# Patient Record
Sex: FEMALE | Race: WHITE | NOT HISPANIC OR LATINO | Employment: OTHER | URBAN - METROPOLITAN AREA
[De-identification: names, ages, dates, MRNs, and addresses within clinical notes are randomized per-mention and may not be internally consistent; named-entity substitution may affect disease eponyms.]

---

## 2019-08-26 ENCOUNTER — HOSPITAL ENCOUNTER (INPATIENT)
Facility: HOSPITAL | Age: 84
LOS: 4 days | Discharge: HOSPICE | DRG: 064 | End: 2019-08-30
Attending: EMERGENCY MEDICINE | Admitting: FAMILY MEDICINE
Payer: MEDICARE

## 2019-08-26 ENCOUNTER — APPOINTMENT (EMERGENCY)
Dept: CT IMAGING | Facility: HOSPITAL | Age: 84
DRG: 064 | End: 2019-08-26
Payer: MEDICARE

## 2019-08-26 DIAGNOSIS — N39.0 UTI (URINARY TRACT INFECTION): ICD-10-CM

## 2019-08-26 DIAGNOSIS — R53.1 RIGHT SIDED WEAKNESS: Primary | ICD-10-CM

## 2019-08-26 DIAGNOSIS — I10 HTN (HYPERTENSION): ICD-10-CM

## 2019-08-26 DIAGNOSIS — E11.9 NEWLY DIAGNOSED DIABETES (HCC): ICD-10-CM

## 2019-08-26 DIAGNOSIS — I65.22 STENOSIS OF LEFT INTERNAL CAROTID ARTERY: ICD-10-CM

## 2019-08-26 DIAGNOSIS — G93.40 ACUTE ENCEPHALOPATHY: ICD-10-CM

## 2019-08-26 DIAGNOSIS — I48.91 NEW ONSET ATRIAL FIBRILLATION (HCC): ICD-10-CM

## 2019-08-26 DIAGNOSIS — R73.9 HYPERGLYCEMIA: ICD-10-CM

## 2019-08-26 DIAGNOSIS — E78.5 HLD (HYPERLIPIDEMIA): ICD-10-CM

## 2019-08-26 DIAGNOSIS — Z51.5 HOSPICE CARE PATIENT: ICD-10-CM

## 2019-08-26 DIAGNOSIS — R47.01 APHASIA: ICD-10-CM

## 2019-08-26 DIAGNOSIS — I63.9 CEREBROVASCULAR ACCIDENT (CVA), UNSPECIFIED MECHANISM (HCC): ICD-10-CM

## 2019-08-26 DIAGNOSIS — R41.82 ALTERED MENTAL STATUS: ICD-10-CM

## 2019-08-26 PROBLEM — R26.2 AMBULATORY DYSFUNCTION: Status: ACTIVE | Noted: 2019-08-26

## 2019-08-26 PROBLEM — E78.2 MIXED HYPERLIPIDEMIA: Status: ACTIVE | Noted: 2019-08-26

## 2019-08-26 PROBLEM — R82.71 ASYMPTOMATIC BACTERIURIA: Status: ACTIVE | Noted: 2019-08-26

## 2019-08-26 LAB
ALBUMIN SERPL BCP-MCNC: 3.3 G/DL (ref 3.5–5)
ALP SERPL-CCNC: 65 U/L (ref 46–116)
ALT SERPL W P-5'-P-CCNC: 14 U/L (ref 12–78)
ANION GAP SERPL CALCULATED.3IONS-SCNC: 9 MMOL/L (ref 4–13)
APTT PPP: 21 SECONDS (ref 23–37)
AST SERPL W P-5'-P-CCNC: 17 U/L (ref 5–45)
BACTERIA UR QL AUTO: ABNORMAL /HPF
BASOPHILS # BLD AUTO: 0.07 THOUSANDS/ΜL (ref 0–0.1)
BASOPHILS NFR BLD AUTO: 1 % (ref 0–1)
BILIRUB DIRECT SERPL-MCNC: 0.2 MG/DL (ref 0–0.2)
BILIRUB SERPL-MCNC: 0.6 MG/DL (ref 0.2–1)
BILIRUB UR QL STRIP: NEGATIVE
BUN SERPL-MCNC: 17 MG/DL (ref 5–25)
CALCIUM SERPL-MCNC: 9.3 MG/DL (ref 8.3–10.1)
CHLORIDE SERPL-SCNC: 100 MMOL/L (ref 100–108)
CHOLEST SERPL-MCNC: 206 MG/DL (ref 50–200)
CLARITY UR: ABNORMAL
CO2 SERPL-SCNC: 29 MMOL/L (ref 21–32)
COLOR UR: YELLOW
CREAT SERPL-MCNC: 1.19 MG/DL (ref 0.6–1.3)
EOSINOPHIL # BLD AUTO: 0.12 THOUSAND/ΜL (ref 0–0.61)
EOSINOPHIL NFR BLD AUTO: 1 % (ref 0–6)
ERYTHROCYTE [DISTWIDTH] IN BLOOD BY AUTOMATED COUNT: 13 % (ref 11.6–15.1)
EST. AVERAGE GLUCOSE BLD GHB EST-MCNC: 157 MG/DL
GFR SERPL CREATININE-BSD FRML MDRD: 40 ML/MIN/1.73SQ M
GLUCOSE SERPL-MCNC: 142 MG/DL (ref 65–140)
GLUCOSE SERPL-MCNC: 165 MG/DL (ref 65–140)
GLUCOSE SERPL-MCNC: 253 MG/DL (ref 65–140)
GLUCOSE UR STRIP-MCNC: NEGATIVE MG/DL
HBA1C MFR BLD: 7.1 % (ref 4.2–6.3)
HCT VFR BLD AUTO: 39.1 % (ref 34.8–46.1)
HDLC SERPL-MCNC: 42 MG/DL (ref 40–60)
HGB BLD-MCNC: 12.9 G/DL (ref 11.5–15.4)
HGB UR QL STRIP.AUTO: ABNORMAL
IMM GRANULOCYTES # BLD AUTO: 0.06 THOUSAND/UL (ref 0–0.2)
IMM GRANULOCYTES NFR BLD AUTO: 1 % (ref 0–2)
INR PPP: 1.02 (ref 0.84–1.19)
KETONES UR STRIP-MCNC: ABNORMAL MG/DL
LDLC SERPL CALC-MCNC: 129 MG/DL (ref 0–100)
LEUKOCYTE ESTERASE UR QL STRIP: ABNORMAL
LYMPHOCYTES # BLD AUTO: 1.95 THOUSANDS/ΜL (ref 0.6–4.47)
LYMPHOCYTES NFR BLD AUTO: 15 % (ref 14–44)
MCH RBC QN AUTO: 30.6 PG (ref 26.8–34.3)
MCHC RBC AUTO-ENTMCNC: 33 G/DL (ref 31.4–37.4)
MCV RBC AUTO: 93 FL (ref 82–98)
MONOCYTES # BLD AUTO: 1.27 THOUSAND/ΜL (ref 0.17–1.22)
MONOCYTES NFR BLD AUTO: 10 % (ref 4–12)
NEUTROPHILS # BLD AUTO: 9.4 THOUSANDS/ΜL (ref 1.85–7.62)
NEUTS SEG NFR BLD AUTO: 72 % (ref 43–75)
NITRITE UR QL STRIP: NEGATIVE
NON-SQ EPI CELLS URNS QL MICRO: ABNORMAL /HPF
NRBC BLD AUTO-RTO: 0 /100 WBCS
PH UR STRIP.AUTO: 6 [PH]
PLATELET # BLD AUTO: 143 THOUSANDS/UL (ref 149–390)
PLATELET # BLD AUTO: 190 THOUSANDS/UL (ref 149–390)
PMV BLD AUTO: 11.2 FL (ref 8.9–12.7)
PMV BLD AUTO: 12.1 FL (ref 8.9–12.7)
POTASSIUM SERPL-SCNC: 3.5 MMOL/L (ref 3.5–5.3)
PROT SERPL-MCNC: 7.6 G/DL (ref 6.4–8.2)
PROT UR STRIP-MCNC: ABNORMAL MG/DL
PROTHROMBIN TIME: 13.4 SECONDS (ref 11.6–14.5)
RBC # BLD AUTO: 4.21 MILLION/UL (ref 3.81–5.12)
RBC #/AREA URNS AUTO: ABNORMAL /HPF
SODIUM SERPL-SCNC: 138 MMOL/L (ref 136–145)
SP GR UR STRIP.AUTO: >=1.03 (ref 1–1.03)
TRIGL SERPL-MCNC: 176 MG/DL
TROPONIN I SERPL-MCNC: <0.02 NG/ML
UROBILINOGEN UR QL STRIP.AUTO: 1 E.U./DL
WBC # BLD AUTO: 12.87 THOUSAND/UL (ref 4.31–10.16)
WBC #/AREA URNS AUTO: ABNORMAL /HPF

## 2019-08-26 PROCEDURE — 85049 AUTOMATED PLATELET COUNT: CPT | Performed by: FAMILY MEDICINE

## 2019-08-26 PROCEDURE — 99223 1ST HOSP IP/OBS HIGH 75: CPT | Performed by: PSYCHIATRY & NEUROLOGY

## 2019-08-26 PROCEDURE — 87086 URINE CULTURE/COLONY COUNT: CPT | Performed by: EMERGENCY MEDICINE

## 2019-08-26 PROCEDURE — 96375 TX/PRO/DX INJ NEW DRUG ADDON: CPT

## 2019-08-26 PROCEDURE — 93005 ELECTROCARDIOGRAM TRACING: CPT

## 2019-08-26 PROCEDURE — 80061 LIPID PANEL: CPT | Performed by: EMERGENCY MEDICINE

## 2019-08-26 PROCEDURE — 85610 PROTHROMBIN TIME: CPT | Performed by: EMERGENCY MEDICINE

## 2019-08-26 PROCEDURE — 83036 HEMOGLOBIN GLYCOSYLATED A1C: CPT | Performed by: EMERGENCY MEDICINE

## 2019-08-26 PROCEDURE — 96365 THER/PROPH/DIAG IV INF INIT: CPT

## 2019-08-26 PROCEDURE — 81001 URINALYSIS AUTO W/SCOPE: CPT | Performed by: EMERGENCY MEDICINE

## 2019-08-26 PROCEDURE — 80076 HEPATIC FUNCTION PANEL: CPT | Performed by: EMERGENCY MEDICINE

## 2019-08-26 PROCEDURE — 99285 EMERGENCY DEPT VISIT HI MDM: CPT | Performed by: EMERGENCY MEDICINE

## 2019-08-26 PROCEDURE — 36415 COLL VENOUS BLD VENIPUNCTURE: CPT | Performed by: EMERGENCY MEDICINE

## 2019-08-26 PROCEDURE — 84484 ASSAY OF TROPONIN QUANT: CPT | Performed by: EMERGENCY MEDICINE

## 2019-08-26 PROCEDURE — 85025 COMPLETE CBC W/AUTO DIFF WBC: CPT | Performed by: EMERGENCY MEDICINE

## 2019-08-26 PROCEDURE — 70496 CT ANGIOGRAPHY HEAD: CPT

## 2019-08-26 PROCEDURE — 82948 REAGENT STRIP/BLOOD GLUCOSE: CPT

## 2019-08-26 PROCEDURE — 85730 THROMBOPLASTIN TIME PARTIAL: CPT | Performed by: EMERGENCY MEDICINE

## 2019-08-26 PROCEDURE — 70498 CT ANGIOGRAPHY NECK: CPT

## 2019-08-26 PROCEDURE — 99223 1ST HOSP IP/OBS HIGH 75: CPT | Performed by: FAMILY MEDICINE

## 2019-08-26 PROCEDURE — 70450 CT HEAD/BRAIN W/O DYE: CPT

## 2019-08-26 PROCEDURE — 96361 HYDRATE IV INFUSION ADD-ON: CPT

## 2019-08-26 PROCEDURE — 80048 BASIC METABOLIC PNL TOTAL CA: CPT | Performed by: EMERGENCY MEDICINE

## 2019-08-26 PROCEDURE — 99285 EMERGENCY DEPT VISIT HI MDM: CPT

## 2019-08-26 RX ORDER — ONDANSETRON 2 MG/ML
4 INJECTION INTRAMUSCULAR; INTRAVENOUS EVERY 4 HOURS PRN
Status: DISCONTINUED | OUTPATIENT
Start: 2019-08-26 | End: 2019-08-30 | Stop reason: HOSPADM

## 2019-08-26 RX ORDER — HYDRALAZINE HYDROCHLORIDE 20 MG/ML
5 INJECTION INTRAMUSCULAR; INTRAVENOUS ONCE
Status: COMPLETED | OUTPATIENT
Start: 2019-08-26 | End: 2019-08-26

## 2019-08-26 RX ORDER — SODIUM CHLORIDE 9 MG/ML
75 INJECTION, SOLUTION INTRAVENOUS CONTINUOUS
Status: DISCONTINUED | OUTPATIENT
Start: 2019-08-26 | End: 2019-08-27

## 2019-08-26 RX ORDER — HYDRALAZINE HYDROCHLORIDE 20 MG/ML
5 INJECTION INTRAMUSCULAR; INTRAVENOUS EVERY 6 HOURS PRN
Status: DISCONTINUED | OUTPATIENT
Start: 2019-08-26 | End: 2019-08-26

## 2019-08-26 RX ORDER — HYDRALAZINE HYDROCHLORIDE 20 MG/ML
10 INJECTION INTRAMUSCULAR; INTRAVENOUS EVERY 6 HOURS PRN
Status: DISCONTINUED | OUTPATIENT
Start: 2019-08-26 | End: 2019-08-29 | Stop reason: ALTCHOICE

## 2019-08-26 RX ORDER — HYDRALAZINE HYDROCHLORIDE 20 MG/ML
5 INJECTION INTRAMUSCULAR; INTRAVENOUS ONCE
Status: DISCONTINUED | OUTPATIENT
Start: 2019-08-26 | End: 2019-08-30 | Stop reason: HOSPADM

## 2019-08-26 RX ORDER — HEPARIN SODIUM 5000 [USP'U]/ML
5000 INJECTION, SOLUTION INTRAVENOUS; SUBCUTANEOUS EVERY 12 HOURS SCHEDULED
Status: DISCONTINUED | OUTPATIENT
Start: 2019-08-26 | End: 2019-08-29 | Stop reason: ALTCHOICE

## 2019-08-26 RX ADMIN — CEFTRIAXONE SODIUM 1000 MG: 10 INJECTION, POWDER, FOR SOLUTION INTRAVENOUS at 13:20

## 2019-08-26 RX ADMIN — IODIXANOL 85 ML: 320 INJECTION, SOLUTION INTRAVASCULAR at 12:52

## 2019-08-26 RX ADMIN — SODIUM CHLORIDE 1000 ML: 0.9 INJECTION, SOLUTION INTRAVENOUS at 13:16

## 2019-08-26 RX ADMIN — HYDRALAZINE HYDROCHLORIDE 5 MG: 20 INJECTION INTRAMUSCULAR; INTRAVENOUS at 15:24

## 2019-08-26 RX ADMIN — HEPARIN SODIUM 5000 UNITS: 5000 INJECTION INTRAVENOUS; SUBCUTANEOUS at 21:50

## 2019-08-26 RX ADMIN — HYDRALAZINE HYDROCHLORIDE 5 MG: 20 INJECTION INTRAMUSCULAR; INTRAVENOUS at 14:20

## 2019-08-26 RX ADMIN — INSULIN LISPRO 1 UNITS: 100 INJECTION, SOLUTION INTRAVENOUS; SUBCUTANEOUS at 19:10

## 2019-08-26 RX ADMIN — SODIUM CHLORIDE 75 ML/HR: 0.9 INJECTION, SOLUTION INTRAVENOUS at 16:30

## 2019-08-26 NOTE — ASSESSMENT & PLAN NOTE
Patient presented with urinalysis showing moderate leukocytes, occasional bacteria, innumerable WBCs, negative nitrites  Patient has been asymptomatic however in the setting of acute encephalopathy and recent fall will treat patient for presumed UTI  Continue Rocephin 1 g IV Q 24 hours  Follow up urine cultures

## 2019-08-26 NOTE — ASSESSMENT & PLAN NOTE
Patient presents with the acute onset of encephalopathy as noticed by her son and daughter-in-law for the past 2 days after a fall  Differential diagnoses include:  Stroke vs UTI vs hypertensive encephalopathy vs seizure vs metabolic abnormalities e g  Hyperglycemia  Most likely contributors to patient's acute encephalopathy is her advanced age and possible advanced dementia with superimposed evolving stroke and UTI  Other contributing factors are the hyperglycemia and accelerated hypertension  Patient will be admitted to the step-down unit and placed on the stroke pathway  CT head without contrast as well as CTA of head and neck with and without contrast negative for any acute findings  As per Neurology, will order MRI brain  Treat UTI with antibiotics  [place] sliding scale insulin protocol to deal with hyperglycemia and check hemoglobin A1c  Adequately treat accelerated hypertension with prn hydralazine  Appreciate Neurology consult and recommendations  Continue frequent neuro checks

## 2019-08-26 NOTE — H&P
H&P- Lilly Yusuf 11/19/1926, 80 y o  female MRN: [id number]    Unit/Bed#: ED 13 Encounter: [id number]    Primary Care Provider: No primary care provider on file  Date and time admitted to hospital: 8/26/2019 10:35 AM        * Acute encephalopathy  Assessment & Plan  Patient presents with the acute onset of encephalopathy as noticed by her son and daughter-in-law for the past 2 days after a fall  Differential diagnoses include:  Stroke vs UTI vs hypertensive encephalopathy vs seizure vs metabolic abnormalities e g  Hyperglycemia  Most likely contributors to patient's acute encephalopathy is her advanced age and possible advanced dementia with superimposed evolving stroke and UTI  Other contributing factors are the hyperglycemia and accelerated hypertension  Patient will be admitted to the step-down unit and placed on the stroke pathway  CT head without contrast as well as CTA of head and neck with and without contrast negative for any acute findings  As per Neurology, will order MRI brain  Treat UTI with antibiotics  [place] sliding scale insulin protocol to deal with hyperglycemia and check hemoglobin A1c  Adequately treat accelerated hypertension with prn hydralazine  Appreciate Neurology consult and recommendations  Continue frequent neuro checks  Aphasia  Assessment & Plan  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  CTA head and neck with and without contrast showed similar findings on CT of head without contrast along with greater than 70% luminal constriction of left ICA at the origin approximately 40% stenosis at the origin of the right ICA  Patient seen and evaluated by Neurology    Suspicion for stroke is high given acuity of symptoms and complete right sided weakness in both upper lower extremities in the context of new onset Afib and left ICA findings  Patient has been placed on stroke pathway  Continue frequent neuro checks  Continue telemetry  Will keep patient NPO, perform swallow study and if patient fails swallow study coma will institute rectal aspirin but if she passes the swallow study will start oral aspirin and statin  Check MRI brain, 2D echo  Check hemoglobin A1c  As per Neurology recommendations, vascular surgery consult has been placed for recommendations regarding left ICA stenosis of 70%  Continue permissive hypertension to maintain SBP at 140-160  PT/OT, PM&R consult  Ambulatory dysfunction  Assessment & Plan  Patient presented with ambulatory dysfunction secondary to ataxia from possible stroke  Patient had a witnessed fall 2 days ago in her bathroom by her daughter-in-law  Will institute fall precautions  PT/OT  New onset atrial fibrillation [facility])  Assessment & Plan  Found to be in new onset AFib during evaluation in the ED  Patient is currently in rate controlled AFib  Continue telemetry monitoring  Cardiology consult placed  Consideration to be given for anticoagulation given possible stroke  CHADS2-VASc2 score-6  Accelerated hypertension  Assessment & Plan  Patient presented with accelerated hypertension with SBP in the 220s  Attempts have been made to control patient's systolic BP with p r n  Doses of IV hydralazine  However if these are not successful, will consider placing patient on step-down and initiating Cardene drip until we can get SBP down to 140-160  Will need to allow permissive hypertension in the setting of presumed stroke to prevent cerebral ischemia  Hyperglycemia  Assessment & Plan  Patient has no known history of type 2 diabetes  Presents with hyperglycemia with blood sugar at 253  Will initiate sliding scale insulin coverage, and will advance to diabetic diet once patient passes a swallow study  Will check a hemoglobin A1c  Accu-Cheks Q a c   And HS    Asymptomatic bacteriuria  Assessment & Plan  Patient presented with urinalysis showing moderate leukocytes, occasional bacteria, innumerable WBCs, negative nitrites  Patient has been asymptomatic however in the setting of acute encephalopathy and recent fall will treat patient for presumed UTI  Continue Rocephin 1 g IV Q 24 hours  Follow up urine cultures  Mixed hyperlipidemia  Assessment & Plan  Patient has elevated total cholesterol including elevated LDL and elevated triglycerides  Will initiate statin once patient passes swallow study  Stenosis of left internal carotid artery  Assessment & Plan  Vascular surgery consult placed at the request of Neurology  VTE Prophylaxis: Heparin  / sequential compression device   Code Status:  DNR/DNI  POLST: POLST form is on file already (pre-hospital)  Discussion with family:  Yes with her son and her daughter-in-law at the bedside    Anticipated Length of Stay:  Patient will be admitted on an Inpatient basis with an anticipated length of stay of  greater than 2 midnights  Justification for Hospital Stay:  Acute encephalopathy with aphasia, ambulatory dysfunction, new onset atrial fibrillation, accelerated hypertension, UTI, and hyperglycemia  Total Time for Visit, including Counseling / Coordination of Care: 45 minutes  Greater than 50% of this total time spent on direct patient counseling and coordination of care  Chief Complaint:   Fall 2 days ago with associated altered mental status and aphasia as well as ambulatory dysfunction  History of Present Illness:    Sung Benitez is a 80 y o  female patient with no known past medical history who presented from home with her son and her daughter-in-law due to a complaint of a fall she sustained 2 days ago on her right side with now near-total neglect of her entire right side of her body with associated aphasia, and altered mental status    According to the patient's son, he states that his mother was in her usual state of health until 2 days ago on Saturday when she fell on her right side on the edge of the bathtub  Afterwards, he states that she was okay except for some pain on the right side  However she spent the whole day Sunday in bed and was able to eat all of her meals and have normal conversations  He then states that this morning she was more confused than normal and was not talking or answering the questions  She also had entire weakness of her right upper and right lower extremities  Upon presentation to the ED, she was found to be in new onset atrial fibrillation with accelerated hypertension and SBPs >220s  She was also found to have an elevated blood sugar of 253 under possible UTI  A CT of the head without contrast was negative except for old chronic infarcts and a CTA of the head and neck with and without contrast was also negative for any acute findings except for a 70% stenosis in the left ICA  She was given a dose of IV Rocephin and a bolus of 1 L of normal saline  She was also treated with IV hydralazine 5 mg x2 in an attempt to bring down her blood pressure  She has been seen and evaluated by Neurology and they recommend admitting her under stroke pathway for further workup  Review of Systems:    Review of Systems   Unable to perform ROS: Mental status change       Past Medical and Surgical History:     Past Medical History:   Diagnosis Date    Dementia        History reviewed  No pertinent surgical history  Meds/Allergies:    Prior to Admission medications    Not on File     I have reviewed home medications using [employer]      Allergies: No Known Allergies    Social History:     Marital Status:    Occupation:  None  Patient Pre-hospital Living Situation:  Lives at home  Patient Pre-hospital Level of Mobility:  Unknown  Patient Pre-hospital Diet Restrictions:  None  Substance Use History:   Social History     Substance and Sexual Activity   Alcohol Use Never    Frequency: Never Social History     Tobacco Use   Smoking Status Never Smoker   Smokeless Tobacco Never Used     Social History     Substance and Sexual Activity   Drug Use Never       Family History:    History reviewed  No pertinent family history  Physical Exam:     Vitals:   Blood Pressure: (!) 228/109 (08/26/19 1515)  Pulse: 79 (08/26/19 1530)  Temperature: 97 9 °F (36 6 °C) (08/26/19 1035)  Temp Source: Oral (08/26/19 1035)  Respirations: 20 (08/26/19 1530)  Height: 5' (152 4 cm) (08/26/19 1035)  Weight - Scale: 54 2 kg (119 lb 7 8 oz) (08/26/19 1035)  SpO2: 97 % (08/26/19 1530)    Physical Exam   Constitutional: She appears well-developed and well-nourished  No distress  HENT:   Head: Normocephalic and atraumatic  Poor oral dentition  Eyes: Pupils are equal, round, and reactive to light  EOM are normal  No scleral icterus  Neck: Normal range of motion  Neck supple  No JVD present  Cardiovascular: Normal rate and normal heart sounds  No murmur heard  Irregularly irregular rhythm   Pulmonary/Chest: Effort normal and breath sounds normal  She has no wheezes  She has no rales  Abdominal: Soft  Bowel sounds are normal  She exhibits no distension  There is no tenderness  There is no rebound and no guarding  Musculoskeletal: She exhibits no edema or tenderness  Lymphadenopathy:     She has no cervical adenopathy  Neurological: She is alert  Skin: Skin is warm and dry  She is not diaphoretic  Additional Data:     Lab Results: I have personally reviewed pertinent reports        Results from last 7 days   Lab Units 08/26/19  1204   WBC Thousand/uL 12 87*   HEMOGLOBIN g/dL 12 9   HEMATOCRIT % 39 1   PLATELETS Thousands/uL 143*   NEUTROS PCT % 72   LYMPHS PCT % 15   MONOS PCT % 10   EOS PCT % 1     Results from last 7 days   Lab Units 08/26/19  1204   SODIUM mmol/L 138   POTASSIUM mmol/L 3 5   CHLORIDE mmol/L 100   CO2 mmol/L 29   BUN mg/dL 17   CREATININE mg/dL 1 19   ANION GAP mmol/L 9   CALCIUM mg/dL 9 3   ALBUMIN g/dL 3 3*   TOTAL BILIRUBIN mg/dL 0 60   ALK PHOS U/L 65   ALT U/L 14   AST U/L 17   GLUCOSE RANDOM mg/dL 253*     Results from last 7 days   Lab Units 08/26/19  1204   INR  1 02                   Imaging: I have personally reviewed pertinent reports  CTA head and neck with and without contrast   Final Result by Selene Rodriguez MD (08/26 2875)   Small chronic cortical infarction left frontal vertex, consistent with CT      Greater than 70% luminal constriction at the origin of the left internal carotid artery      Approximately 40% stenosis at the origin of the right internal carotid artery      No additional evidence of critical stenosis, dissection or occlusion involving remaining cervical carotid or vertebral segments or visualized cerebral arteries      Advanced multilevel degenerative cervical spondylosis                     Workstation performed: [id number]         CT recon only cervical spine (No Charge)   Final Result by Selene Rodriguez MD (08/26 2287)   Advanced multilevel degenerative spondylosis      No acute cervical spine fracture or traumatic malalignment  Workstation performed: [id number]         CT head without contrast   Final Result by Hope Chavez MD (08/26 8746)      No acute intracranial process  No skull fracture  Chronic microangiopathy and 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  Workstation performed: [id number]         MRI Inpatient Order    (Results Pending)       EKG, Pathology, and Other Studies Reviewed on Admission:   · EKG:  CT head without contrast, CTA head and neck with and without contrast     Allscripts / Epic Records Reviewed: Yes     ** Please Note: This note has been constructed using a voice recognition system   **

## 2019-08-26 NOTE — ED NOTES
Notified Kenisha Santana  And Dr Summer Molina patient fall no loss of consciousness and no blood thinners  Placed order for CT scan of head       Isaiah Bliss RN  08/26/19 3268

## 2019-08-26 NOTE — ASSESSMENT & PLAN NOTE
Patient presented with ambulatory dysfunction secondary to ataxia from possible stroke  Patient had a witnessed fall 2 days ago in her bathroom by her daughter-in-law  Will institute fall precautions  PT/OT

## 2019-08-26 NOTE — ASSESSMENT & PLAN NOTE
Patient has no known history of type 2 diabetes  Presents with hyperglycemia with blood sugar at 253  Will initiate sliding scale insulin coverage, and will advance to diabetic diet once patient passes a swallow study  Will check a hemoglobin A1c  Accu-Cheks Q a c   And HS

## 2019-08-26 NOTE — ED NOTES
1 CC                              Fall, altered mental status and right sided weakness  2  Orientation status            Baseline dementia with confusion  3  Abnormal labs, tests, vitals      Triglycerides 176, cholesterol 206, , glucose 253, WBC 12 87        T 97 9, /102, HR 62 R 24, 97% room air  4  Medication drips                none  5  Time of last narcotics        none  6  IV lines, drains, tubes         # 20 right ac  7  Isolation status                  none  8  Skin                                  wdl  9  Ambulation status           Abnormal gait dysfunction  10   ED phone number           Matt Lipscomb RN  08/26/19 8369

## 2019-08-26 NOTE — NURSING NOTE
Patient arrived from Emergency Department unable to complete NIH stroke appropriately due to patient being nonverbal and being unable to follow commands    Called emergency contact for patient that is listed and left message for call back in order to fill out admission data base and MRI check list

## 2019-08-26 NOTE — ASSESSMENT & PLAN NOTE
Patient presented with accelerated hypertension with SBP in the 220s  Attempts have been made to control patient's systolic BP with p r n  Doses of IV hydralazine  However if these are not successful, will consider placing patient on step-down and initiating Cardene drip until we can get SBP down to 140-160  Will need to allow permissive hypertension in the setting of presumed stroke to prevent cerebral ischemia

## 2019-08-26 NOTE — ASSESSMENT & PLAN NOTE
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  CTA head and neck with and without contrast showed similar findings on CT of head without contrast along with greater than 70% luminal constriction of left ICA at the origin approximately 40% stenosis at the origin of the right ICA  Patient seen and evaluated by Neurology  Suspicion for stroke is high given acuity of symptoms and complete right sided weakness in both upper lower extremities in the context of new onset Afib and left ICA findings  Patient has been placed on stroke pathway  Continue frequent neuro checks  Continue telemetry  Will keep patient NPO, perform swallow study and if patient fails swallow study coma will institute rectal aspirin but if she passes the swallow study will start oral aspirin and statin  Check MRI brain, 2D echo  Check hemoglobin A1c  As per Neurology recommendations, vascular surgery consult has been placed for recommendations regarding left ICA stenosis of 70%  Continue permissive hypertension to maintain SBP at 140-160  PT/OT, PM&R consult

## 2019-08-26 NOTE — ASSESSMENT & PLAN NOTE
Patient has elevated total cholesterol including elevated LDL and elevated triglycerides  Will initiate statin once patient passes swallow study

## 2019-08-26 NOTE — ASSESSMENT & PLAN NOTE
Found to be in new onset AFib during evaluation in the ED  Patient is currently in rate controlled AFib  Continue telemetry monitoring  Cardiology consult placed  Consideration to be given for anticoagulation given possible stroke  CHADS2-VASc2 score-6

## 2019-08-26 NOTE — CONSULTS
Consultation - Neurology   [facility] 80 y o  female MRN: [id number]  Unit/Bed#: ED 15 Encounter: [id number]      Assessment/Plan   Assessment/Plan:  59-year-old female with history of dementia presenting with instability, truncal ataxia with leaning to the right and aphasia  Exam is consistent with left hemisphere infarction, potentially due to new onset atrial fibrillation versus symptomatic left ICA stenosis  1  Ambulatory dysfunction/gait dysfunction, aphasia:  -initiate stroke pathway:  -initial CT head with no acute intracranial pathology  -CTA head/neck with L ICA stenosis   -recommend vascular surgery input in regards to L ICA stenosis  -obtain MRI brain  -obtain echocardiogram  -rectal aspirin if she fails dysphagia evaluation, if swallowing ok oral aspirin and statin  -check hemoglobin A1C and lipid panel  -frequent neuro checks  -telemetry monitoring  -stroke education to be provided  -therapies to follow  -will attempt to contact family further discuss baseline neurologic status/history of dementia    Discussed plan of care with attending neurologist     History of Present Illness     Reason for Consult / Principal Problem:  Ambulatory/gait dysfunction, truncal ataxia, aphasia  Hx and PE limited by:  Patient with dementia, language barrier as well  HPI: [facility] is a 80 y o   female with history as mentioned above in assessment who neurology is asked to evaluate in regards to ambulatory dysfunction with leaning to the right as well as aphasia  History obtained primarily through chart review as patient carries history of dementia per ED team   Patient reportedly sustained a fall yesterday, she went to bed relatively early last night and upon waking up this morning noticed that she continued to lean to the right with ambulation and gait attempts  There was also concern for aphasia and thus patient was brought to the ED for evaluation    Patient has been markedly hypertensive throughout the course of the morning, CT head was obtained negative for acute intracranial pathology, did note chronic left frontal cortical infarct  EKG per ED team revealing atrial fibrillation  No prior notes nor accessibility to Care everywhere at time of exam      Consult to neurology  Consult performed by: Elvin Hazel PA-C  Consult ordered by: Margaret Christian MD          Review of Systems   Reason unable to perform ROS: Dementia, language barrier  Historical Information   Past Medical History:   Diagnosis Date    Dementia      History reviewed  No pertinent surgical history  Social History   Social History     Substance and Sexual Activity   Alcohol Use Never    Frequency: Never     Social History     Substance and Sexual Activity   Drug Use Never     Social History     Tobacco Use   Smoking Status Never Smoker   Smokeless Tobacco Never Used     Family History: History reviewed  No pertinent family history  Review of previous medical records was completed  Meds/Allergies   current meds:   Current Facility-Administered Medications   Medication Dose Route Frequency    ceftriaxone (ROCEPHIN) 1 g/50 mL in dextrose IVPB  1,000 mg Intravenous Once    sodium chloride 0 9 % bolus 1,000 mL  1,000 mL Intravenous Once    and PTA meds:   None       No Known Allergies    Objective   Vitals:Blood pressure (!) 172/85, pulse 61, temperature 97 9 °F (36 6 °C), temperature source Oral, resp  rate (!) 45, height 5' (1 524 m), weight 54 2 kg (119 lb 7 8 oz), SpO2 95 %  ,Body mass index is 23 34 kg/m²  No intake or output data in the 24 hours ending 08/26/19 1313    Invasive Devices: Invasive Devices     Peripheral Intravenous Line            Peripheral IV 08/26/19 Right Antecubital less than 1 day                Physical Exam   Constitutional: She appears well-developed and well-nourished  HENT:   Head: Normocephalic and atraumatic  Eyes: Pupils are equal, round, and reactive to light   EOM are normal    Neck: Normal range of motion  Neck supple  Cardiovascular: Bradycardia present  Pulmonary/Chest: Effort normal and breath sounds normal    Abdominal: Soft  Bowel sounds are normal    Musculoskeletal: Normal range of motion  Neurological: She is alert  Reflex Scores:       Bicep reflexes are 1+ on the right side and 1+ on the left side  Brachioradialis reflexes are 1+ on the right side and 1+ on the left side  Skin: Skin is warm and dry  Neurologic Exam     Mental Status   Awake and alert, no verbal output (speaks foreign language per ED team)  Cranial Nerves     CN II   Visual fields full to confrontation  CN III, IV, VI   Pupils are equal, round, and reactive to light  Extraocular motions are normal    Nystagmus: none   Diplopia: none  Ophthalmoparesis: none    CN V   Facial sensation intact  CN VII   Facial expression full, symmetric  CN VIII   CN VIII normal      CN IX, X   CN IX normal    CN X normal      CN XI   CN XI normal      CN XII   CN XII normal      Motor Exam With passive lift and release, less tone on R than L, L arm can maintain antigravity  Spontaneously and purposely moving L arm  Less tone in R leg, brisker withdrawal of L leg to plantar and noxious stimuli than on R  Sensory Exam   Cannot formally assess with language barrier, grimace to noxious stimuli in all extremities        Gait, Coordination, and Reflexes     Reflexes   Right brachioradialis: 1+  Left brachioradialis: 1+  Right biceps: 1+  Left biceps: 1+  Right ankle clonus: absent  Left ankle clonus: absent      Lab Results:   CBC:   Results from last 7 days   Lab Units 08/26/19  1204   WBC Thousand/uL 12 87*   RBC Million/uL 4 21   HEMOGLOBIN g/dL 12 9   HEMATOCRIT % 39 1   MCV fL 93   PLATELETS Thousands/uL 143*   , BMP/CMP:   Results from last 7 days   Lab Units 08/26/19  1204   SODIUM mmol/L 138   POTASSIUM mmol/L 3 5   CHLORIDE mmol/L 100   CO2 mmol/L 29   BUN mg/dL 17   CREATININE mg/dL 1 19 CALCIUM mg/dL 9 3   AST U/L 17   ALT U/L 14   ALK PHOS U/L 65   EGFR ml/min/1 73sq m 40   , Vitamin B12:   , HgBA1C:   , TSH:   , Coagulation:   Results from last 7 days   Lab Units 08/26/19  1204   INR  1 02   , Lipid Profile:   , Ammonia:   , Urinalysis:   Results from last 7 days   Lab Units 08/26/19  1244   COLOR UA  Yellow   CLARITY UA  Cloudy   SPEC GRAV UA  >=1 030   PH UA  6 0   LEUKOCYTES UA  Moderate*   NITRITE UA  Negative   GLUCOSE UA mg/dl Negative   KETONES UA mg/dl Trace*   BILIRUBIN UA  Negative   BLOOD UA  Moderate*   , Drug Screen:   , Medication Drug Levels:       Invalid input(s): CARBAMAZEPINE,  PHENOBARB, LACOSAMIDE, OXCARBAZEPINE  Imaging Studies: I have personally reviewed pertinent films in PACS   CT head 08/26/2019: No acute intracranial process      No skull fracture      Chronic microangiopathy and 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  CTA head/neck 08/26/2019: Small chronic cortical infarction left frontal vertex, consistent with CT     Greater than 70% luminal constriction at the origin of the left internal carotid artery     Approximately 40% stenosis at the origin of the right internal carotid artery     No additional evidence of critical stenosis, dissection or occlusion involving remaining cervical carotid or vertebral segments or visualized cerebral arteries     Advanced multilevel degenerative cervical spondylosis    EKG, Pathology, and Other Studies: I have personally reviewed pertinent reports  VTE Prophylaxis: None, in ED    Code Status: No Order    Total time spent today 55 minutes  Discussed plan of care with primary team:  Stroke workup, concern for embolic stroke in setting of new onset AFib versus symptomatic left ICA stenosis, antiplatelet/statin, neuro checks, therapies

## 2019-08-26 NOTE — ED PROVIDER NOTES
H&P Exam - Trauma   Lei Mcfarlane 80 y o  female MRN: [id number]  Unit/Bed#: ED 13/ED 13 Encounter: [id number]    Assessment/Plan   Trauma Alert: Trauma Acuity: Trauma Evaluation  Model of Arrival: Trauma Mode of Arrival: Direct from scene via    Trauma Team: Current Providers  Attending Provider: Aletha Sierra MD  ED Technician: Lilliana Infante  Registered Nurse: Larissa Mcintyre RN  ED Technician: Patt Mcallister  ED Technician: Lilliana Infante  ED Technician: Paul Gonzalez  Consultants: None - neuro consulted, unrelated to trauma    Trauma Active Problems:   Fall, AMS    Trauma Plan:   CTH, CT C spine  Medical workup, concern for stroke    Chief Complaint:   Chief Complaint   Patient presents with    Fall     Family states"patient fell on the carpet at home yesterday and then fell this morning in the bathroom in the tub"  Patient not taking blood thinners  No loss of consciousness  History of Present Illness   HPI:  Lei Mcfarlane is a 80 y o  female who presents with altered mental status and right-sided weakness  All history comes from family, as patient is reportedly confused at baseline and is nonverbal on presentation  (Also, patient only speaks [place])  Family states that patient had an unwitnessed fall yesterday and had been acting normally after the fall  She was last seen normal around 6 or 7 PM when family put her to bed  When she awoke this morning she seemed to be altered  Daughter-in-law took her to the bathroom and watched as patient seemed to be too weak to stand and fell on the tub on her buttocks  She did not hit her head or lose consciousness  Family mentions that patient has not spoken today and that she seems to have decreased movement in her right arm and leg  Patient does not follow commands at this time  Family states that if she was at her baseline she would likely be angry right now, as she does not like hospitals or doctors)      On presentation patient appears to have right-sided neglect, possible right upper and lower extremity weakness (though difficult to assess, as patient does not follow commands, also potentially limited by neglect)  Patient not noted to move RUE or RLE during exam, though she is right-handed  She holds a tissue with her left hand  Discussed concern with family that patient may have had a stroke  Patient has not seen a doctor in many years and does not have any diagnosed medical problems  However, during initial few hours in the ED she appears to have HTN (SBP 180s-200s), a fib, DM (glucose on )  A1c and lipid panel are pending  Unable to calculate NIHSS, as patient does not follow commands at all  Mechanism:Details of Incident: Patient fell this morning in the bathroom and landed on her butt on the  tub at home  Patient denies loss of consciousness and denies pain Injury Date: 08/26/19 Injury Time: 0800 Injury Occurence Location - [address] [place] [address] Way: Patient fell this morning from standing position at home in the bathroom and landed on the  tub  Patient denies loss of consciousness and blood thinners  HPI  Review of Systems   Unable to perform ROS: Mental status change       Historical Information     Immunizations: There is no immunization history on file for this patient  Past Medical History:   Diagnosis Date    Dementia      History reviewed  No pertinent family history  History reviewed  No pertinent surgical history    Social History     Socioeconomic History    Marital status:      Spouse name: None    Number of children: None    Years of education: None    Highest education level: None   Occupational History    None   Social Needs    Financial resource strain: None    Food insecurity:     Worry: None     Inability: None    Transportation needs:     Medical: None     Non-medical: None   Tobacco Use    Smoking status: Never Smoker    Smokeless tobacco: Never Used   Substance and Sexual Activity    Alcohol use: Never     Frequency: Never    Drug use: Never    Sexual activity: None   Lifestyle    Physical activity:     Days per week: None     Minutes per session: None    Stress: None   Relationships    Social connections:     Talks on phone: None     Gets together: None     Attends Yarsanism service: None     Active member of club or organization: None     Attends meetings of clubs or organizations: None     Relationship status: None    Intimate partner violence:     Fear of current or ex partner: None     Emotionally abused: None     Physically abused: None     Forced sexual activity: None   Other Topics Concern    None   Social History Narrative    None       Family History: non-contributory    Meds/Allergies   None       No Known Allergies    PHYSICAL EXAM    PE limited by: patient does not follow commands, does not respond to questioning    Objective   Vitals:   First set: Temperature: 97 9 °F (36 6 °C) (08/26/19 1035)  Pulse: 67 (08/26/19 1035)  Respirations: 18 (08/26/19 1035)  Blood Pressure: (!) 199/82 (08/26/19 1035)  SpO2: 97 % (08/26/19 1035)    Primary Survey:   (A) Airway: intact  (B) Breathing: clear bilaterally  (C) Circulation: Pulses:   normal  (D) Disabliity:  GCS Total:  11, Eye Opening:   Spontaneous = 4, Motor Response: Obeys commands = 6 and Verbal Response:  None = 1  (E) Expose:  Completed    Secondary Survey: (Click on Physical Exam tab above)  Physical Exam   Constitutional: She appears well-nourished  No distress  HENT:   Head: Normocephalic and atraumatic  Eyes: EOM are normal    Neck: Normal range of motion  Neck supple  Cardiovascular: Normal rate  An irregularly irregular rhythm present  Pulmonary/Chest: Effort normal and breath sounds normal  No respiratory distress  Abdominal: Soft  She exhibits no distension  There is no tenderness  Musculoskeletal: Normal range of motion     Full passive ROM of all 4 extremities   No C, T, or L spine stepoffs, no obviously tenderness   Neurological: She is alert  Questionable right-sided neglect   Skin: Skin is warm and dry  She is not diaphoretic  Nursing note and vitals reviewed  Invasive Devices     Peripheral Intravenous Line            Peripheral IV 08/26/19 Right Antecubital less than 1 day                Lab Results:   Results Reviewed     Procedure Component Value Units Date/Time    Lipid Panel with Direct LDL reflex [[id number]]  (Abnormal) Collected:  08/26/19 1204    Lab Status:  Final result Specimen:  Blood from Arm, Right Updated:  08/26/19 1441     Cholesterol 206 mg/dL      Triglycerides 176 mg/dL      HDL, Direct 42 mg/dL      LDL Calculated 129 mg/dL     Hemoglobin A1C [[id number]] Collected:  08/26/19 1204    Lab Status: In process Specimen:  Blood from Arm, Right Updated:  08/26/19 1423    Urine Microscopic [[id number]]  (Abnormal) Collected:  08/26/19 1244    Lab Status:  Final result Specimen:  Urine, Straight Cath Updated:  08/26/19 1257     RBC, UA None Seen /hpf      WBC, UA Innumerable /hpf      Epithelial Cells None Seen /hpf      Bacteria, UA Occasional /hpf     Urine culture [[id number]] Collected:  08/26/19 1244    Lab Status:   In process Specimen:  Urine, Straight Cath Updated:  08/26/19 1257    UA w Reflex to Microscopic w Reflex to Culture [[id number]]  (Abnormal) Collected:  08/26/19 1244    Lab Status:  Final result Specimen:  Urine, Straight Cath Updated:  08/26/19 1249     Color, UA Yellow     Clarity, UA Cloudy     Specific Gravity, UA >=1 030     pH, UA 6 0     Leukocytes, UA Moderate     Nitrite, UA Negative     Protein,  (2+) mg/dl      Glucose, UA Negative mg/dl      Ketones, UA Trace mg/dl      Urobilinogen, UA 1 0 E U /dl      Bilirubin, UA Negative     Blood, UA Moderate    APTT [[id number]]  (Abnormal) Collected:  08/26/19 1204    Lab Status:  Final result Specimen:  Blood from Arm, Right Updated:  08/26/19 1239     PTT 21 seconds     Protime-INR [[id number]] (Normal) Collected:  08/26/19 1204    Lab Status:  Final result Specimen:  Blood from Arm, Right Updated:  08/26/19 1239     Protime 13 4 seconds      INR 1 02    Troponin I [[id number]]  (Normal) Collected:  08/26/19 1204    Lab Status:  Final result Specimen:  Blood from Arm, Right Updated:  08/26/19 1237     Troponin I <0 02 ng/mL     Basic metabolic panel [[id number]]  (Abnormal) Collected:  08/26/19 1204    Lab Status:  Final result Specimen:  Blood from Arm, Right Updated:  08/26/19 1234     Sodium 138 mmol/L      Potassium 3 5 mmol/L      Chloride 100 mmol/L      CO2 29 mmol/L      ANION GAP 9 mmol/L      BUN 17 mg/dL      Creatinine 1 19 mg/dL      Glucose 253 mg/dL      Calcium 9 3 mg/dL      eGFR 40 ml/min/1 73sq m     Narrative:       [place] guidelines for Chronic Kidney Disease (CKD):     Stage 1 with normal or high GFR (GFR > 90 mL/min/1 73 square meters)    Stage 2 Mild CKD (GFR = 60-89 mL/min/1 73 square meters)    Stage 3A Moderate CKD (GFR = 45-59 mL/min/1 73 square meters)    Stage 3B Moderate CKD (GFR = 30-44 mL/min/1 73 square meters)    Stage 4 Severe CKD (GFR = 15-29 mL/min/1 73 square meters)    Stage 5 End Stage CKD (GFR <15 mL/min/1 73 square meters)  Note: GFR calculation is accurate only with a steady state creatinine    Hepatic function panel [[id number]]  (Abnormal) Collected:  08/26/19 1204    Lab Status:  Final result Specimen:  Blood from Arm, Right Updated:  08/26/19 1234     Total Bilirubin 0 60 mg/dL      Bilirubin, Direct 0 20 mg/dL      Alkaline Phosphatase 65 U/L      AST 17 U/L      ALT 14 U/L      Total Protein 7 6 g/dL      Albumin 3 3 g/dL     CBC and differential [[id number]]  (Abnormal) Collected:  08/26/19 1204    Lab Status:  Final result Specimen:  Blood from Arm, Right Updated:  08/26/19 1211     WBC 12 87 Thousand/uL      RBC 4 21 Million/uL      Hemoglobin 12 9 g/dL      Hematocrit 39 1 %      MCV 93 fL      MCH 30 6 pg      MCHC 33 0 g/dL      RDW 13 0 %      MPV 12 1 fL      Platelets 377 Thousands/uL      nRBC 0 /100 WBCs      Neutrophils Relative 72 %      Immat GRANS % 1 %      Lymphocytes Relative 15 %      Monocytes Relative 10 %      Eosinophils Relative 1 %      Basophils Relative 1 %      Neutrophils Absolute 9 40 Thousands/µL      Immature Grans Absolute 0 06 Thousand/uL      Lymphocytes Absolute 1 95 Thousands/µL      Monocytes Absolute 1 27 Thousand/µL      Eosinophils Absolute 0 12 Thousand/µL      Basophils Absolute 0 07 Thousands/µL                  Imaging Studies:   Direct to CT: Yes  CTA head and neck with and without contrast   Final Result by Lin Barillas MD (08/26 1324)   Small chronic cortical infarction left frontal vertex, consistent with CT      Greater than 70% luminal constriction at the origin of the left internal carotid artery      Approximately 40% stenosis at the origin of the right internal carotid artery      No additional evidence of critical stenosis, dissection or occlusion involving remaining cervical carotid or vertebral segments or visualized cerebral arteries      Advanced multilevel degenerative cervical spondylosis                     Workstation performed: [id number]         CT recon only cervical spine (No Charge)   Final Result by Lin Barillas MD (08/26 1324)   Advanced multilevel degenerative spondylosis      No acute cervical spine fracture or traumatic malalignment  Workstation performed: [id number]         CT head without contrast   Final Result by Raven Rubio MD (08/26 1126)      No acute intracranial process  No skull fracture  Chronic microangiopathy and 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                          Workstation performed: [id number]             Other Studies:     Code Status: No Order  Advance Directive and Living Will:      Power of :    POLST:      Procedures  Procedures         ED Course  ED Course as of Aug 26 1511 Mon Aug 26, 2019   1305 WBC, UA(!): Innumerable   1305 Bacteria, UA: Occasional   1305 Glucose, Random(!): 253   EKG: A fib @ 52 BPM, nonspecific ST/T wave abnormality, no prior for comparison      MDM    Disposition  [facility] Transfer: No  Final diagnoses:   Right sided weakness   Altered mental status   New onset atrial fibrillation (HCC)   Newly diagnosed diabetes ([facility] [address] )   HTN (hypertension)   HLD (hyperlipidemia)   UTI (urinary tract infection) - concern for UTI based on AMS, abnormal UA     Time reflects when diagnosis was documented in both MDM as applicable and the Disposition within this note     Time User Action Codes Description Comment    8/26/2019  2:46 PM Bernarda Shepard [R53 1] Right sided weakness     8/26/2019  2:46 PM Bernarda Shepard [R41 82] Altered mental status     8/26/2019  3:09 PM Vinny Lee [R47 01] Aphasia     8/26/2019  3:09 PM Vinny Lee [G93 40] Acute encephalopathy       ED Disposition     ED Disposition Condition Date/Time Comment    Admit Stable Mon Aug 26, 2019  2:46 PM Case was discussed with Dr Bill James and the patient's admission status was agreed to be Admission Status: inpatient status to the service of Dr Bill James   Follow-up Information    None       Patient's Medications    No medications on file     No discharge procedures on file        ED Provider  Electronically Signed by         Tomás Moses MD  08/29/19 1994

## 2019-08-27 ENCOUNTER — APPOINTMENT (INPATIENT)
Dept: NON INVASIVE DIAGNOSTICS | Facility: HOSPITAL | Age: 84
DRG: 064 | End: 2019-08-27
Payer: MEDICARE

## 2019-08-27 ENCOUNTER — APPOINTMENT (INPATIENT)
Dept: MRI IMAGING | Facility: HOSPITAL | Age: 84
DRG: 064 | End: 2019-08-27
Payer: MEDICARE

## 2019-08-27 LAB
ALBUMIN SERPL BCP-MCNC: 2.9 G/DL (ref 3.5–5)
ALP SERPL-CCNC: 56 U/L (ref 46–116)
ALT SERPL W P-5'-P-CCNC: 11 U/L (ref 12–78)
ANION GAP SERPL CALCULATED.3IONS-SCNC: 7 MMOL/L (ref 4–13)
AST SERPL W P-5'-P-CCNC: 21 U/L (ref 5–45)
ATRIAL RATE: 46 BPM
BACTERIA UR CULT: NORMAL
BASOPHILS # BLD AUTO: 0.06 THOUSANDS/ΜL (ref 0–0.1)
BASOPHILS NFR BLD AUTO: 1 % (ref 0–1)
BILIRUB SERPL-MCNC: 0.5 MG/DL (ref 0.2–1)
BUN SERPL-MCNC: 14 MG/DL (ref 5–25)
CALCIUM SERPL-MCNC: 8.7 MG/DL (ref 8.3–10.1)
CHLORIDE SERPL-SCNC: 103 MMOL/L (ref 100–108)
CO2 SERPL-SCNC: 27 MMOL/L (ref 21–32)
CREAT SERPL-MCNC: 0.91 MG/DL (ref 0.6–1.3)
EOSINOPHIL # BLD AUTO: 0.17 THOUSAND/ΜL (ref 0–0.61)
EOSINOPHIL NFR BLD AUTO: 1 % (ref 0–6)
ERYTHROCYTE [DISTWIDTH] IN BLOOD BY AUTOMATED COUNT: 13.1 % (ref 11.6–15.1)
GFR SERPL CREATININE-BSD FRML MDRD: 55 ML/MIN/1.73SQ M
GLUCOSE SERPL-MCNC: 125 MG/DL (ref 65–140)
GLUCOSE SERPL-MCNC: 128 MG/DL (ref 65–140)
GLUCOSE SERPL-MCNC: 134 MG/DL (ref 65–140)
GLUCOSE SERPL-MCNC: 141 MG/DL (ref 65–140)
GLUCOSE SERPL-MCNC: 166 MG/DL (ref 65–140)
GLUCOSE SERPL-MCNC: 212 MG/DL (ref 65–140)
HCT VFR BLD AUTO: 36.7 % (ref 34.8–46.1)
HGB BLD-MCNC: 12 G/DL (ref 11.5–15.4)
IMM GRANULOCYTES # BLD AUTO: 0.05 THOUSAND/UL (ref 0–0.2)
IMM GRANULOCYTES NFR BLD AUTO: 0 % (ref 0–2)
LYMPHOCYTES # BLD AUTO: 2.65 THOUSANDS/ΜL (ref 0.6–4.47)
LYMPHOCYTES NFR BLD AUTO: 22 % (ref 14–44)
MAGNESIUM SERPL-MCNC: 2 MG/DL (ref 1.6–2.6)
MCH RBC QN AUTO: 30.5 PG (ref 26.8–34.3)
MCHC RBC AUTO-ENTMCNC: 32.7 G/DL (ref 31.4–37.4)
MCV RBC AUTO: 93 FL (ref 82–98)
MONOCYTES # BLD AUTO: 1.29 THOUSAND/ΜL (ref 0.17–1.22)
MONOCYTES NFR BLD AUTO: 11 % (ref 4–12)
NEUTROPHILS # BLD AUTO: 7.6 THOUSANDS/ΜL (ref 1.85–7.62)
NEUTS SEG NFR BLD AUTO: 65 % (ref 43–75)
NRBC BLD AUTO-RTO: 0 /100 WBCS
PHOSPHATE SERPL-MCNC: 3.6 MG/DL (ref 2.3–4.1)
PLATELET # BLD AUTO: 168 THOUSANDS/UL (ref 149–390)
PMV BLD AUTO: 11.6 FL (ref 8.9–12.7)
POTASSIUM SERPL-SCNC: 3.8 MMOL/L (ref 3.5–5.3)
PROT SERPL-MCNC: 6.8 G/DL (ref 6.4–8.2)
QRS AXIS: 26 DEGREES
QRSD INTERVAL: 92 MS
QT INTERVAL: 424 MS
QTC INTERVAL: 394 MS
RBC # BLD AUTO: 3.94 MILLION/UL (ref 3.81–5.12)
SODIUM SERPL-SCNC: 137 MMOL/L (ref 136–145)
T WAVE AXIS: 40 DEGREES
VENTRICULAR RATE: 52 BPM
WBC # BLD AUTO: 11.82 THOUSAND/UL (ref 4.31–10.16)

## 2019-08-27 PROCEDURE — 80053 COMPREHEN METABOLIC PANEL: CPT | Performed by: FAMILY MEDICINE

## 2019-08-27 PROCEDURE — 99223 1ST HOSP IP/OBS HIGH 75: CPT | Performed by: PHYSICAL MEDICINE & REHABILITATION

## 2019-08-27 PROCEDURE — 83735 ASSAY OF MAGNESIUM: CPT | Performed by: FAMILY MEDICINE

## 2019-08-27 PROCEDURE — 93306 TTE W/DOPPLER COMPLETE: CPT

## 2019-08-27 PROCEDURE — 84100 ASSAY OF PHOSPHORUS: CPT | Performed by: FAMILY MEDICINE

## 2019-08-27 PROCEDURE — 97163 PT EVAL HIGH COMPLEX 45 MIN: CPT

## 2019-08-27 PROCEDURE — 99222 1ST HOSP IP/OBS MODERATE 55: CPT | Performed by: PHYSICIAN ASSISTANT

## 2019-08-27 PROCEDURE — 99232 SBSQ HOSP IP/OBS MODERATE 35: CPT | Performed by: NURSE PRACTITIONER

## 2019-08-27 PROCEDURE — 85025 COMPLETE CBC W/AUTO DIFF WBC: CPT | Performed by: FAMILY MEDICINE

## 2019-08-27 PROCEDURE — G8978 MOBILITY CURRENT STATUS: HCPCS

## 2019-08-27 PROCEDURE — G8987 SELF CARE CURRENT STATUS: HCPCS

## 2019-08-27 PROCEDURE — 93306 TTE W/DOPPLER COMPLETE: CPT | Performed by: INTERNAL MEDICINE

## 2019-08-27 PROCEDURE — G8988 SELF CARE GOAL STATUS: HCPCS

## 2019-08-27 PROCEDURE — 92610 EVALUATE SWALLOWING FUNCTION: CPT

## 2019-08-27 PROCEDURE — 93010 ELECTROCARDIOGRAM REPORT: CPT | Performed by: INTERNAL MEDICINE

## 2019-08-27 PROCEDURE — 82948 REAGENT STRIP/BLOOD GLUCOSE: CPT

## 2019-08-27 PROCEDURE — 97167 OT EVAL HIGH COMPLEX 60 MIN: CPT

## 2019-08-27 PROCEDURE — 99232 SBSQ HOSP IP/OBS MODERATE 35: CPT | Performed by: PSYCHIATRY & NEUROLOGY

## 2019-08-27 PROCEDURE — 70551 MRI BRAIN STEM W/O DYE: CPT

## 2019-08-27 PROCEDURE — G8979 MOBILITY GOAL STATUS: HCPCS

## 2019-08-27 RX ORDER — ATORVASTATIN CALCIUM 40 MG/1
40 TABLET, FILM COATED ORAL
Status: DISCONTINUED | OUTPATIENT
Start: 2019-08-27 | End: 2019-08-29 | Stop reason: ALTCHOICE

## 2019-08-27 RX ORDER — ASPIRIN 81 MG/1
81 TABLET, CHEWABLE ORAL DAILY
Status: DISCONTINUED | OUTPATIENT
Start: 2019-08-27 | End: 2019-08-29 | Stop reason: ALTCHOICE

## 2019-08-27 RX ADMIN — CEFTRIAXONE SODIUM 1000 MG: 10 INJECTION, POWDER, FOR SOLUTION INTRAVENOUS at 13:21

## 2019-08-27 RX ADMIN — SODIUM CHLORIDE 75 ML/HR: 0.9 INJECTION, SOLUTION INTRAVENOUS at 03:44

## 2019-08-27 RX ADMIN — HEPARIN SODIUM 5000 UNITS: 5000 INJECTION INTRAVENOUS; SUBCUTANEOUS at 09:52

## 2019-08-27 RX ADMIN — INSULIN LISPRO 2 UNITS: 100 INJECTION, SOLUTION INTRAVENOUS; SUBCUTANEOUS at 23:45

## 2019-08-27 RX ADMIN — HEPARIN SODIUM 5000 UNITS: 5000 INJECTION INTRAVENOUS; SUBCUTANEOUS at 20:48

## 2019-08-27 RX ADMIN — INSULIN LISPRO 1 UNITS: 100 INJECTION, SOLUTION INTRAVENOUS; SUBCUTANEOUS at 12:47

## 2019-08-27 RX ADMIN — ASPIRIN 81 MG 81 MG: 81 TABLET ORAL at 12:42

## 2019-08-27 RX ADMIN — ATORVASTATIN CALCIUM 40 MG: 40 TABLET, FILM COATED ORAL at 17:17

## 2019-08-27 NOTE — ASSESSMENT & PLAN NOTE
· Patient presents with the acute onset of encephalopathy as noticed by her son and daughter-in-law for the past 2 days after a fall  · Differential diagnoses include:  Stroke vs UTI vs hypertensive encephalopathy vs seizure vs metabolic abnormalities e g  Hyperglycemia  · Patient id patient on stroke pathway MRI brain pending, echocardiogram, and neurology recommendations reviewed  · Speech therapy evaluation pending in setting of aphasia neurology recommending initiating rectal aspirin and of fails speech evaluation  · UA microscopic grossly positive trend urine culture and continue antibiotics for now  · CT head without contrast as well as CTA of head and neck with and without contrast negative for any acute findings

## 2019-08-27 NOTE — PLAN OF CARE
Problem: Prexisting or High Potential for Compromised Skin Integrity  Goal: Skin integrity is maintained or improved  Description  INTERVENTIONS:  - Identify patients at risk for skin breakdown  - Assess and monitor skin integrity  - Assess and monitor nutrition and hydration status  - Monitor labs   - Assess for incontinence   - Turn and reposition patient  - Assist with mobility/ambulation  - Relieve pressure over bony prominences  - Avoid friction and shearing  - Provide appropriate hygiene as needed including keeping skin clean and dry  - Evaluate need for skin moisturizer/barrier cream  - Collaborate with interdisciplinary team   - Patient/family teaching  - Consider wound care consult   Outcome: Progressing     Problem: Neurological Deficit  Goal: Neurological status is stable or improving  Description  Interventions:  - Monitor and assess patient's level of consciousness, motor function, sensory function, and level of assistance needed for ADLs  - Monitor and report changes from baseline  Collaborate with interdisciplinary team to initiate plan and implement interventions as ordered  - Provide and maintain a safe environment  - Consider seizure precautions  - Consider fall precautions  - Consider aspiration precautions  - Consider bleeding precautions  Outcome: Progressing     Problem: Activity Intolerance/Impaired Mobility  Goal: Mobility/activity is maintained at optimum level for patient  Description  Interventions:  - Assess and monitor patient  barriers to mobility and need for assistive/adaptive devices  - Assess patient's emotional response to limitations  - Collaborate with interdisciplinary team and initiate plans and interventions as ordered  - Encourage independent activity per ability   - Maintain proper body alignment  - Perform active/passive rom as tolerated/ordered    - Plan activities to conserve energy   - Turn patient as appropriate  Outcome: Progressing     Problem: Communication Impairment  Goal: Ability to express needs and understand communication  Description  Assess patient's communication skills and ability to understand information  Patient will demonstrate use of effective communication techniques, alternative methods of communication and understanding even if not able to speak  - Encourage communication and provide alternate methods of communication as needed  - Collaborate with case management/ for discharge needs  - Include patient/family/caregiver in decisions related to communication  Outcome: Progressing     Problem: Potential for Aspiration  Goal: Non-ventilated patient's risk of aspiration is minimized  Description  Assess and monitor vital signs, respiratory status, and labs (WBC)  Monitor for signs of aspiration (tachypnea, cough, rales, wheezing, cyanosis, fever)  - Assess and monitor patient's ability to swallow  - Place patient up in chair to eat if possible  - HOB up at 90 degrees to eat if unable to get patient up into chair   - Supervise patient during oral intake  - Instruct patient/ family to take small bites  - Instruct patient/ family to take small single sips when taking liquids  - Follow patient-specific strategies generated by speech pathologist   Outcome: Progressing     Problem: Nutrition  Goal: Nutrition/Hydration status is improving  Description  Monitor and assess patient's nutrition/hydration status for malnutrition (ex- brittle hair, bruises, dry skin, pale skin and conjunctiva, muscle wasting, smooth red tongue, and disorientation)  Collaborate with interdisciplinary team and initiate plan and interventions as ordered  Monitor patient's weight and dietary intake as ordered or per policy  Utilize nutrition screening tool and intervene per policy  Determine patient's food preferences and provide high-protein, high-caloric foods as appropriate       - Assist patient with eating   - Allow adequate time for meals   - Encourage patient to take dietary supplement as ordered  - Collaborate with clinical nutritionist   - Include patient/family/caregiver in decisions related to nutrition    Outcome: Progressing     Problem: CARDIOVASCULAR - ADULT  Goal: Maintains optimal cardiac output and hemodynamic stability  Description  INTERVENTIONS:  - Monitor I/O, vital signs and rhythm  - Monitor for S/S and trends of decreased cardiac output  - Administer and titrate ordered vasoactive medications to optimize hemodynamic stability  - Assess quality of pulses, skin color and temperature  - Assess for signs of decreased coronary artery perfusion  - Instruct patient to report change in severity of symptoms  Outcome: Progressing  Goal: Absence of cardiac dysrhythmias or at baseline rhythm  Description  INTERVENTIONS:  - Continuous cardiac monitoring, vital signs, obtain 12 lead EKG if ordered  - Administer antiarrhythmic and heart rate control medications as ordered  - Monitor electrolytes and administer replacement therapy as ordered  Outcome: Progressing     Problem: METABOLIC, FLUID AND ELECTROLYTES - ADULT  Goal: Electrolytes maintained within normal limits  Description  INTERVENTIONS:  - Monitor labs and assess patient for signs and symptoms of electrolyte imbalances  - Administer electrolyte replacement as ordered  - Monitor response to electrolyte replacements, including repeat lab results as appropriate  - Instruct patient on fluid and nutrition as appropriate  Outcome: Progressing  Goal: Fluid balance maintained  Description  INTERVENTIONS:  - Monitor labs   - Monitor I/O and WT  - Instruct patient on fluid and nutrition as appropriate  - Assess for signs & symptoms of volume excess or deficit  Outcome: Progressing  Goal: Glucose maintained within target range  Description  INTERVENTIONS:  - Monitor Blood Glucose as ordered  - Assess for signs and symptoms of hyperglycemia and hypoglycemia  - Administer ordered medications to maintain glucose within target range  - Assess nutritional intake and initiate nutrition service referral as needed  Outcome: Progressing     Problem: MUSCULOSKELETAL - ADULT  Goal: Maintain or return mobility to safest level of function  Description  INTERVENTIONS:  - Assess patient's ability to carry out ADLs; assess patient's baseline for ADL function and identify physical deficits which impact ability to perform ADLs (bathing, care of mouth/teeth, toileting, grooming, dressing, etc )  - Assess/evaluate cause of self-care deficits   - Assess range of motion  - Assess patient's mobility  - Assess patient's need for assistive devices and provide as appropriate  - Encourage maximum independence but intervene and supervise when necessary  - Involve family in performance of ADLs  - Assess for home care needs following discharge   - Consider OT consult to assist with ADL evaluation and planning for discharge  - Provide patient education as appropriate  Outcome: Progressing  Goal: Maintain proper alignment of affected body part  Description  INTERVENTIONS:  - Support, maintain and protect limb and body alignment  - Provide patient/ family with appropriate education  Outcome: Progressing

## 2019-08-27 NOTE — ASSESSMENT & PLAN NOTE
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  · CTA head and neck with and without contrast showed similar findings on CT of head without contrast along with greater than 70% luminal constriction of left ICA at the origin approximately 40% stenosis at the origin of the right ICA  · Neurology is suspecting a component of stroke continue pathway  · Continue stroke pathway  · Continue frequent neuro checks  · Continue telemetry  · Continue patient NPO, perform swallow study and if patient fails swallow study coma will institute rectal aspirin but if she passes the swallow study will start oral aspirin and statin  · Check MRI brain, 2D echo  · Hemoglobin A1c noted to be 7 1  · As per Neurology recommendations, vascular surgery consult has been placed for recommendations regarding left ICA stenosis of 70%  · Continue permissive hypertension to maintain SBP at 140-160   · PT/OT, PM&R consult

## 2019-08-27 NOTE — SOCIAL WORK
Patient is confused at this time  Cm contacted patient son Eda Stock who also identifies as patient POA  Cm informed lived by herself until she was 80years old with son and daughter in law [facility] for 2 5 years, no other children involved  Cm informed home is two levels patient is always upstairs  Cm informed patient was independent with ADL's, no dme use, no vna and hasn't gone to a doctor in 20 years  Patient is not currently on any medications if discharged on any medications the preferred pharmacy is [facility]  Son denies patient to have any MH/SA  Cm reviewed patient PT recommendations, son stated he does not trust [facility] facilities and would like patient to discharge home with vna, no preference in agency  Son stated he will await the results of the MRI however, is strongly preferring patient to discharge home with vna  Son is aware of assigned cm and her contact information  Assigned cm also aware  Cm team will continue to follow and assess for needs  CM reviewed discharge planning process including the following: identifying help at home, patient preference for discharge planning needs, pharmacy preference, and availability of treatment team to discuss questions or concerns patient and/or family may have regarding understanding medications and recognizing signs and symptoms once discharged  CM also encouraged patient to follow up with all recommended appointments after discharge  Patient advised of importance for patient and family to participate in managing patients medical well being

## 2019-08-27 NOTE — ASSESSMENT & PLAN NOTE
· Patient presented with accelerated hypertension with SBP in the 220s    · Overall clinically improved  · Of for permissive blood pressure this time in setting of presumed stroke

## 2019-08-27 NOTE — ASSESSMENT & PLAN NOTE
· Patient has elevated total cholesterol including elevated LDL and elevated triglycerides  · Will initiate statin once patient passes swallow study

## 2019-08-27 NOTE — OCCUPATIONAL THERAPY NOTE
Occupational Therapy Evaluation      Brittney Baldearschris    8/27/2019    Patient Active Problem List   Diagnosis    Acute encephalopathy    Aphasia    Ambulatory dysfunction    New onset atrial fibrillation (HCC)    Accelerated hypertension    Hyperglycemia    Asymptomatic bacteriuria    Mixed hyperlipidemia    Stenosis of left internal carotid artery       Past Medical History:   Diagnosis Date    Dementia        History reviewed  No pertinent surgical history  08/27/19 1011   Note Type   Note type Eval/Treat   Restrictions/Precautions   Braces or Orthoses   (none per DIL)   Other Precautions Cognitive; Fall Risk;Telemetry; Bed Alarm  (possible Language barrier vs speech effects 2' TIA/CVA)   Pain Assessment   Pain Assessment FLACC   Pain Rating: FLACC (Rest) - Face 0   Pain Rating: FLACC (Rest) - Legs 0   Pain Rating: FLACC (Rest) - Activity 0   Pain Rating: FLACC (Rest) - Cry 0   Pain Rating: FLACC (Rest) - Consolability 0   Score: FLACC (Rest) 0   Home Living   Type of Home House   Home Layout Two level;Elevator;Performs ADLs on one level; Able to live on main level with bedroom/bathroom  (pt on 2nd level)   Bathroom Shower/Tub Tub/shower unit  (DIL A with bed bath as needed)   [address] 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)   Comments Pt previously walked everywhere; lived alone until she was 80   Lifestyle   Autonomy Pt's DIL reports PLOF was A with ADLS, IADLS, and not a      Reciprocal Relationships son and DIL   Intrinsic Gratification loves children   Psychosocial   Psychosocial (WDL) X   Patient Behaviors/Mood Not interactive   ADL   Eating Assistance 2  Maximal Assistance   Eating Deficit Increased time to complete;Bringing food to mouth assist;Supervision/safety   Grooming Assistance 2  Maximal Assistance   Grooming Deficit Increased time to complete;Supervision/safety;Verbal cueing   UB Bathing Assistance 1  Total Assistance   UB Bathing Deficit Increased time to complete;Supervision/safety;Verbal cueing;Setup;Steadying   LB Bathing Assistance 1  Total Assistance   LB Bathing Deficit Setup;Steadying;Verbal cueing;Supervision/safety; Increased time to complete   UB Dressing Assistance 1  Total Assistance   UB Dressing Deficit Setup;Steadying;Verbal cueing;Supervision/safety; Increased time to complete   LB Dressing Assistance 1  Total Assistance   LB Dressing Deficit Setup;Steadying;Verbal cueing;Supervision/safety; Increased time to complete   Toileting Assistance  1  Total Assistance   Toileting Deficit Setup;Steadying;Verbal cueing;Supervison/safety; Increased time to complete   Functional Assistance 1  Total Assistance   Functional Deficit Setup;Steadying;Verbal cueing;Supervision/safety; Increased time to complete   Bed Mobility   Rolling R 2  Maximal assistance   Additional items Assist x 2; Increased time required;Verbal cues;LE management   Rolling L 2  Maximal assistance   Additional items Assist x 2; Increased time required;Verbal cues;LE management   Additional Comments Pt was unable to complete tasks and follow VCs; it is unclear if its due to language barrier or affects of medical condition   Transfers   Sit to Stand Unable to assess   Additional Comments not safe to complete at this time   Balance   Static Sitting   (unable to assess)   Activity Tolerance   Activity Tolerance Other (Comment)  (inability to follow commands)   Medical Staff Made Aware DANNA Amaya   Nurse Made Aware PIETRO Cecilia Tejeda confirmed pt appropriate for PT eval; post-session: pt repositioned for comfort, all needs within reach and bed alarm engaged   RUE Assessment   RUE Assessment X  (difficulty with assessment due to aphasia vs language yesy)   RUE Strength   RUE Overall Strength Deficits   LUE Assessment   LUE Assessment X  (difficulty with assessment due to aphasia vs language yesy)   LUE Strength   LUE Overall Strength Deficits   Hand Function   Gross Motor Coordination Impaired   Fine Motor Coordination Impaired   Sensation   Light Touch Not tested   Sharp/Dull Not tested   Stereognosis Not tested   Additional Comments difficulty with assessment due to aphasia vs language barrier   Vision-Basic Assessment   Current Vision   (difficulty with assessment due to aphasia vs language yesy)   Cognition   Overall Cognitive Status Impaired   Arousal/Participation Persistent stimuli required;Poorly responsive   Attention Difficulty dividing attention   Orientation Level Unable to assess   Memory Unable to assess   Following Commands Unable to follow multistep commands   Comments not able to follow commands and no verbal output noted   Assessment   Limitation Decreased ADL status; Decreased UE ROM; Decreased UE strength;Decreased Safe judgement during ADL;Decreased cognition;Decreased endurance;Decreased sensation;Visual deficit; Decreased fine motor control;Decreased self-care trans;Decreased high-level ADLs; Non-func R UE   Prognosis Fair   Assessment Pt is a 80 y o  female seen for OT evaluation s/p admit to [facility] on 8/26/2019 w/ Acute encephalopathy  Comorbidities affecting pt's functional performance at time of assessment include:Afib, HTN and aphasia, amb dysfunction, hyperglycemia, and stenosis of L internal carotid artery   Orders placed for OT evaluation and treatment and "up with a" order  Performed at least two patient identifiers during session including name and wristband   Personal factors affecting pt at time of IE include:behavioral pattern, difficulty performing ADLS, difficulty performing IADLS , limited insight into deficits, compliance, flat affect, decreased initiation and engagement , financial barriers, health management  and environment  Prior to admission, pt reports A with ADLs, A with IADLs, and (-) driving  Upon evaluation: Pt requires total A with UB ADLs, LB ADLs, and unable to assess xfers and functional mobility at this time due to poor ability to follow directions  It is uncleared if this is due to aphasia caused by recent medical events or language barrier  However, family reports pt became non verbal with them also on Monday (yesterday) and it appears this has been caused by medical reasons  Limitations 2* the following deficits impacting occupational performance: weakness, decreased ROM, decreased strength, decreased dynamic sit/ stand balance, decreased activity tolerance, decreased standing tolerance time for self care and functional mobility, decreased postural control, impaired [facility], impaired [facility], impaired sensation, impaired attention, impaired initiation, impaired memory, impaired sequencing, impaired problem solving, decreased safety awareness, abnormal tone, impaired interpersonal skills, environmental deficits, decreased coping skills, decreased mobilty and requiring external assistance to complete transitional movements  Pt to benefit from continued skilled OT tx while in the hospital to address deficits as defined above and maximize level of functional independence w ADL's and functional mobility  Occupational Performance areas to address include: eating, grooming, bathing/shower, toilet hygiene, dressing, medication management, socialization, health maintenance, functional mobility, community mobility, clothing management and household maintenance  From OT standpoint, recommendation at time of d/c would be STR       Plan   Treatment Interventions ADL retraining;Functional transfer training;UE strengthening/ROM; Endurance training;Cognitive reorientation;Patient/family training;Equipment evaluation/education; Fine motor coordination activities; Compensatory technique education;Continued evaluation;Cardiac education; Energy conservation; Activityengagement   Goal Expiration Date 09/09/19   OT Frequency 3-5x/wk   Recommendation   OT Discharge Recommendation Short Term Rehab   Barthel Index   Feeding 0   Bathing 0   Grooming Score 0   Dressing Score 0   Bladder Score 0   Bowels Score 5   Toilet Use Score 0   Transfers (Bed/Chair) Score 0   Mobility (Level Surface) Score 0   Stairs Score 0   Barthel Index Score 5   Modified [place] Scale   Modified [place] Scale 5     Occupational Therapy goals: In 7-14 days:      1 - Patient will increase OOB/ sitting tolerance to 2-4 hours per day for increased participation in self care and leisure tasks with no s/s of exertion  2 - Patient will increase sitting tolerance at edge of bed to 20 minutes to complete UB ADLs @ min assist level  3 - Patient will verbalize and demonstrate weight shifting technique in bed and sitting position with 10% verbal cues    4 - Patient will complete self feeding task with set up assist    5 - Patient will complete rolling to R/L with use of side rail and min assist x1      6 - Patient will complete grooming task with set up assist      7 - Patient will increase standing tolerance time to 5 minutes with unilateral UE support to complete sink level ADLs@ min assist level    8 - Patient will follow 100% simple one step directives without verbal cues  9 - Patient will transfer bed to Chair / toilet with min assist with AD as indicated  10 - Patient will complete UB ADLs with min assist    11 - Patient will complete LB ADLs with mod assist with the use of adaptive equipment  12 - Patient will complete toileting hygiene with mod assist assist/ supervision for thoroughness      15 - Patient/ Family will demonstrate competency with UE Home Exercise Program     14 - Patient will complete Interest checklist to explore participation in play/ leisure tasks for increased satisfaction and quality of life        Duane Rang, MS OTR/L

## 2019-08-27 NOTE — PLAN OF CARE
Problem: PHYSICAL THERAPY ADULT  Goal: Performs mobility at highest level of function for planned discharge setting  See evaluation for individualized goals  Description  Treatment/Interventions: LE strengthening/ROM, Therapeutic exercise, Patient/family training, Bed mobility, Continued evaluation, Spoke to nursing, OT, Family          See flowsheet documentation for full assessment, interventions and recommendations  Note:   Prognosis: Good  Problem List: Decreased strength, Decreased range of motion, Decreased endurance, Decreased mobility, Decreased coordination, Decreased cognition  Assessment: Pt is 80 y o  female seen for PT evaluation s/p admit to [facility] on 8/26/2019 w/ Acute encephalopathy  PT consulted to assess pt's functional mobility and d/c needs  Order placed for PT eval and tx, w/ up w/ A order  Performed at least 2 patient identifiers during session: Name and wristband  Comorbidities affecting pt's physical performance at time of assessment include: aphasia, ambulatory dysfunction, new onset A-fib, accelerated hypertension, hyperglycemia, asymptomatic bacteriuria, mixed hyperlipidemia, stenosis of left internal carotid artery  PTA, pt was independent w/ ambulation w/ no AD, requires A for ADLs and IADLs, ambulates household distances, lives w/ family in two level house and retired  Personal factors affecting pt at time of IE include: inaccessible home environment, lives in two story house, communication issues, inability to ambulate household distances, inability to navigate level surfaces w/o external assistance, decreased cognition, positive fall history, inability to perform IADLs, inability to perform ADLs and inability to live alone   Please find objective findings from PT assessment regarding body systems outlined above with impairments and limitations including weakness, decreased ROM, decreased endurance, impaired coordination, decreased activity tolerance, decreased functional mobility tolerance, fall risk and decreased cognition  The following objective measures performed on IE also reveal limitations: Barthel Index: 10/100 and Modified [place]: 5 (severe disability)  Pt's clinical presentation is currently unstable/unpredictable seen in pt's presentation of ongoing medical management/monitoring, evident in need for assist w/ all phases of mobility when usually mobilizing independently, and inability to follow commands  Pt to benefit from continued PT tx to address deficits as defined above and maximize level of functional independent mobility and consistency  From PT/mobility standpoint, recommendation at time of d/c would be STR pending progress in order to facilitate return to PLOF  Barriers to Discharge: Inaccessible home environment, Decreased caregiver support     Recommendation: Short-term skilled PT     PT - OK to Discharge: Yes(when medically cleared; if to STR)    See flowsheet documentation for full assessment

## 2019-08-27 NOTE — CONSULTS
PHYSICAL MEDICINE AND REHABILITATION CONSULT NOTE  Sera Calle 80 y o  female MRN: [id number]  Unit/Bed#: -01 Encounter: [id number]    Requested by (Physician/Service): Jennifer Rubio MD  Reason for Consultation:  Assessment of rehabilitation needs  Reason for Hospitalization:  Aphasia [R47 01]  Altered mental status [R41 82]  Leg injury [S89 90XA]  Hyperglycemia [R73 9]  New onset atrial fibrillation ([address] ) [I48 91]  Acute encephalopathy [G93 40]  Right sided weakness [R53 1]  Stenosis of left internal carotid artery [I65 22]      History of Present Illness:  Sera Clale is a 80 y o  female who  has a past medical history of Dementia  who presented to the [facility] on 08/26 with her son and daughter-in-law due to complain of a fall that she sustained 2 days ago on the right side and then developed aphasia, altered mental status and near total neglect of the entire right side  Patient's son states that his mother was in her usual state of health until 2 days prior to admission when she fell on the right side on the edge of a bathtub  She was noted to be more confused on the morning of admission and hence was brought to the ED  Upon presentation in the ED she was found to have new onset atrial fibrillation and accelerated hypertension she was also noted with the elevated blood sugar and possible UTI  A CT of the head was negative except for old chronic infarcts and a CTA of the head and neck was also negative for any acute findings except 70% stenosis of the left ICA  Patient was treated with IV antibiotics and neurology was consulted  She was admitted under stroke pathway for further workup  PM&R consulted for rehabilitation recommendations      Restrictions include:  none    Hospital Complications/Comorbidities:   Complications: As above  Comorbidities: As above    Morbid Obesity (BMI > 40) No     Last Weight Last BMI   Wt Readings from Last 1 Encounters:   08/26/19 54 2 kg (119 lb 7 8 oz)    Body mass index is 23 34 kg/m²  Functional History:     Prior to Admission:     Functional Status: Needs assistance with ADLs and functional mobility  Needs assistance with IADLs  Present:  Physical Therapy Occupational Therapy Speech Therapy   Maximal assistance with bed mobility Maximal assist with eating, grooming, total assist with upper body bathing, total assist with lower body bathing, total assist with upper body and lower body dressing and toileting Tolerating dysphagia level 1 diet     Past Medical History:   Past Surgical History:   Family History:     Past Medical History:   Diagnosis Date    Dementia     History reviewed  No pertinent surgical history  History reviewed  No pertinent family history   - No family history of neurologic diseases        Medications:    Current Facility-Administered Medications:     aspirin chewable tablet 81 mg, 81 mg, Oral, Daily, LENA Villafuerte, 81 mg at 08/27/19 1242    atorvastatin (LIPITOR) tablet 40 mg, 40 mg, Oral, Daily With Dinner, LENA Villafuerte    ceftriaxone (ROCEPHIN) 1 g/50 mL in dextrose IVPB, 1,000 mg, Intravenous, Q24H, Taty Arroyo MD, Last Rate: 100 mL/hr at 08/27/19 1321, 1,000 mg at 08/27/19 1321    heparin (porcine) subcutaneous injection 5,000 Units, 5,000 Units, Subcutaneous, Q12H [facility], 5,000 Units at 08/27/19 0952 **AND** [COMPLETED] Platelet count, , , Once, Taty Arroyo MD    hydrALAZINE (APRESOLINE) injection 10 mg, 10 mg, Intravenous, Q6H PRN, Taty Arroyo MD, 5 mg at 08/26/19 1524    hydrALAZINE (APRESOLINE) injection 5 mg, 5 mg, Intravenous, Once, Julius Arce MD    insulin lispro (HumaLOG) 100 units/mL subcutaneous injection 1-5 Units, 1-5 Units, Subcutaneous, Q6H [facility], 1 Units at 08/27/19 1247 **AND** Fingerstick Glucose (POCT), , , Q6H, Piter Patel MD    ondansetron (ZOFRAN) injection 4 mg, 4 mg, Intravenous, Q4H PRN, Taty Arroyo MD    sodium chloride 0 9 % infusion, 75 mL/hr, Intravenous, Continuous, Alyson Borjas MD, Last Rate: 75 mL/hr at 08/27/19 0344, 75 mL/hr at 08/27/19 0344    Allergies:   No Known Allergies     Social History:    Social History     Socioeconomic History    Marital status:      Spouse name: None    Number of children: None    Years of education: None    Highest education level: None   Occupational History    None   Social Needs    Financial resource strain: None    Food insecurity:     Worry: None     Inability: None    Transportation needs:     Medical: None     Non-medical: None   Tobacco Use    Smoking status: Never Smoker    Smokeless tobacco: Never Used   Substance and Sexual Activity    Alcohol use: Never     Frequency: Never    Drug use: Never    Sexual activity: None   Lifestyle    Physical activity:     Days per week: None     Minutes per session: None    Stress: None   Relationships    Social connections:     Talks on phone: None     Gets together: None     Attends [employer] service: None     Active member of club or organization: None     Attends meetings of clubs or organizations: None     Relationship status: None    Intimate partner violence:     Fear of current or ex partner: None     Emotionally abused: None     Physically abused: None     Forced sexual activity: None   Other Topics Concern    None   Social History Narrative    None        Brittney Trotter Lives with: lives with their family  She lives in a(n) duplex home  The living area: can live on one level  Equipment in home: Walker, cane, manual wheelchair and quad cane      Review of Systems: A 10-point review of systems was performed  Negative except as listed above       Physical Exam:  Vital Signs:      Temp:  [97 2 °F (36 2 °C)-99 5 °F (37 5 °C)] 98 2 °F (36 8 °C)  HR:  [58-94] 73  Resp:  [18-27] 18  BP: (119-228)/() 126/79   Intake/Output Summary (Last 24 hours) at 8/27/2019 1444  Last data filed at 8/27/2019 0344  Gross per 24 hour   Intake 900 ml Output 100 ml   Net 800 ml        Laboratory:      Lab Results   Component Value Date    HGB 12 0 08/27/2019    HCT 36 7 08/27/2019    WBC 11 82 (H) 08/27/2019     Lab Results   Component Value Date    BUN 14 08/27/2019    K 3 8 08/27/2019     08/27/2019    CREATININE 0 91 08/27/2019     Lab Results   Component Value Date    PROTIME 13 4 08/26/2019    INR 1 02 08/26/2019        General: alert, no apparent distress, cooperative and comfortable  Head: Normal, normocephalic, atraumatic  Eye: Normal external eye, conjunctiva, lids   Ears: Normal external ears  Nose: Normal external nose, mucus membranes  Pharynx: Dental Hygiene adequate  Normal buccal mucosa  Normal pharynx  Neck / Thyroid: Supple, no masses, nodes, nodules or enlargement  Pulmonary: clear to auscultation bilaterally and no crackles, no wheezes, chest expansion normal  Cardiovascular: normal rate, regular rhythm, normal S1, S2, no murmurs, rubs, clicks or gallops  Abdomen: soft, nontender, nondistended, no masses or organomegaly  Skin/Extremity: no rashes, no erythema, no peripheral edema    Neurologic:  Does not follow commands, does not mobilize in any manner , cranial nerves 2-12 are intact, no involuntary movements, left upper extremity antigravity spontaneously, flexor withdrawal extremities, decreased on the right side, less tone on the right as compared to the left reflexes are 1+ and symmetric bilaterally, grimace to noxious stimuli in all extremities, cannot formally assess sensory examination secondary to language barrier        Imaging: Reviewed  Cta Head And Neck With And Without Contrast    Result Date: 8/26/2019  Impression: Small chronic cortical infarction left frontal vertex, consistent with CT Greater than 70% luminal constriction at the origin of the left internal carotid artery Approximately 40% stenosis at the origin of the right internal carotid artery No additional evidence of critical stenosis, dissection or occlusion involving remaining cervical carotid or vertebral segments or visualized cerebral arteries Advanced multilevel degenerative cervical spondylosis Workstation performed: [id number]     Ct Head Without Contrast    Result Date: 8/26/2019  Impression: No acute intracranial process  No skull fracture  Chronic microangiopathy and 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  Workstation performed: [id number]     Ct Recon Only Cervical Spine (no Charge)    Result Date: 8/26/2019  Impression: Advanced multilevel degenerative spondylosis No acute cervical spine fracture or traumatic malalignment  Workstation performed: [id number]       Assessment and Recommendations:  80-year-old female with history of dementia presents with change in mental status right-sided weakness and aphasia most likely secondary to acute left MCA CVA    Impairments:  Impaired functional mobility and ability to perform ADL's  Impaired cognition and speech    Recommendations:  - Chart reviewed  - Imaging reviewed   - Continue PT/OT/SLP while inpatient  -MRI of the brain is pending  - Pt is unable to safely return home until she is at the supervision/contact-guard functional level (25% assistance level with or without a device)  modified-independent functional level (0% assistance with a device) independent functional level (0% assistance without a device)        - Based upon patient's current functional level, we recommend referral to subacute rehabilitation facility (daily nursing care and 1-2 hours of therapy 5 days a week)  to allow for achievement of modified-independent functional level      - Disposition unclear at this point in time but inpatient rehab a possibility in the near future pending achievement of clinically stability, potential for functional progress  Thank you for allowing the PM&R service to participate in the care of this patient  We will continue to follow [address] [place] [address]'s progress with you   Please do not hesitate to call with questions or concerns    ** Please Note: Fluency Direct voice to text software may have been used in the creation of this document   **

## 2019-08-27 NOTE — PLAN OF CARE
Problem: OCCUPATIONAL THERAPY ADULT  Goal: Performs self-care activities at highest level of function for planned discharge setting  See evaluation for individualized goals  Description  Treatment Interventions: ADL retraining, Functional transfer training, UE strengthening/ROM, Endurance training, Cognitive reorientation, Patient/family training, Equipment evaluation/education, Fine motor coordination activities, Compensatory technique education, Continued evaluation, Cardiac education, Energy conservation, Activityengagement          See flowsheet documentation for full assessment, interventions and recommendations  Note:   Limitation: Decreased ADL status, Decreased UE ROM, Decreased UE strength, Decreased Safe judgement during ADL, Decreased cognition, Decreased endurance, Decreased sensation, Visual deficit, Decreased fine motor control, Decreased self-care trans, Decreased high-level ADLs, Non-func R UE  Prognosis: Fair  Assessment: Pt is a 80 y o  female seen for OT evaluation s/p admit to [facility] on 8/26/2019 w/ Acute encephalopathy  Comorbidities affecting pt's functional performance at time of assessment include:Afib, HTN and aphasia, amb dysfunction, hyperglycemia, and stenosis of L internal carotid artery   Orders placed for OT evaluation and treatment and "up with a" order  Performed at least two patient identifiers during session including name and wristband  Personal factors affecting pt at time of IE include:behavioral pattern, difficulty performing ADLS, difficulty performing IADLS , limited insight into deficits, compliance, flat affect, decreased initiation and engagement , financial barriers, health management  and environment  Prior to admission, pt reports A with ADLs, A with IADLs, and (-) driving  Upon evaluation: Pt requires total A with UB ADLs, LB ADLs, and unable to assess xfers and functional mobility at this time due to poor ability to follow directions   It is uncleared if this is due to aphasia caused by recent medical events or language barrier  However, family reports pt became non verbal with them also on Monday (yesterday) and it appears this has been caused by medical reasons  Limitations 2* the following deficits impacting occupational performance: weakness, decreased ROM, decreased strength, decreased dynamic sit/ stand balance, decreased activity tolerance, decreased standing tolerance time for self care and functional mobility, decreased postural control, impaired GMC, impaired [address], impaired sensation, impaired attention, impaired initiation, impaired memory, impaired sequencing, impaired problem solving, decreased safety awareness, abnormal tone, impaired interpersonal skills, environmental deficits, decreased coping skills, decreased mobilty and requiring external assistance to complete transitional movements  Pt to benefit from continued skilled OT tx while in the hospital to address deficits as defined above and maximize level of functional independence w ADL's and functional mobility  Occupational Performance areas to address include: eating, grooming, bathing/shower, toilet hygiene, dressing, medication management, socialization, health maintenance, functional mobility, community mobility, clothing management and household maintenance  From OT standpoint, recommendation at time of d/c would be STR         OT Discharge Recommendation: Short Term Rehab

## 2019-08-27 NOTE — PROGRESS NOTES
Progress Note Live Rod 11/19/1926, 80 y o  female MRN: [id number]    Unit/Bed#: -01 Encounter: [id number]    Primary Care Provider: No primary care provider on file  Date and time admitted to hospital: 8/26/2019 10:35 AM        * Acute encephalopathy  Assessment & Plan  · Patient presents with the acute onset of encephalopathy as noticed by her son and daughter-in-law for the past 2 days after a fall  · Differential diagnoses include:  Stroke vs UTI vs hypertensive encephalopathy vs seizure vs metabolic abnormalities e g  Hyperglycemia  · Patient id patient on stroke pathway MRI brain pending, echocardiogram, and neurology recommendations reviewed  · Speech therapy evaluation pending in setting of aphasia neurology recommending initiating rectal aspirin and of fails speech evaluation  · UA microscopic grossly positive trend urine culture and continue antibiotics for now  · CT head without contrast as well as CTA of head and neck with and without contrast negative for any acute findings  Aphasia  Assessment & Plan  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  · CTA head and neck with and without contrast showed similar findings on CT of head without contrast along with greater than 70% luminal constriction of left ICA at the origin approximately 40% stenosis at the origin of the right ICA  · Neurology is suspecting a component of stroke continue pathway  · Continue stroke pathway  · Continue frequent neuro checks  · Continue telemetry  · Continue patient NPO, perform swallow study and if patient fails swallow study coma will institute rectal aspirin but if she passes the swallow study will start oral aspirin and statin  · Check MRI brain, 2D echo  · Hemoglobin A1c noted to be 7 1    · As per Neurology recommendations, vascular surgery consult has been placed for recommendations regarding left ICA stenosis of 70%  · Continue permissive hypertension to maintain SBP at 140-160   · PT/OT, PM&R consult  Accelerated hypertension  Assessment & Plan  · Patient presented with accelerated hypertension with SBP in the 220s  · Overall clinically improved  · Of for permissive blood pressure this time in setting of presumed stroke    New onset atrial fibrillation [facility])  Assessment & Plan  · Patient found to be in new onset AFib during evaluation in the ED  · Continue telemetry monitoring  · Continues to be rate controlled  · Cardiology consult pending  · Consider anticoagulation in setting of new onset AFib with stroke risk CHADS2-VASc2 score-6  · Cardiology recommendations appreciated    Hyperglycemia  Assessment & Plan  · Patient has no known history of type 2 diabetes  · Patient with hyperglycemia on admission however A1c noted to be 7 1  · Continue home regimen upon discharge    Ambulatory dysfunction  Assessment & Plan  · Patient presented with ambulatory dysfunction secondary to ataxia from possible stroke  · Patient had a witnessed fall 2 days ago in her bathroom by her daughter-in-law  · Will institute fall precautions  · PT/OT  Asymptomatic bacteriuria  Assessment & Plan  · Patient presented with urinalysis showing moderate leukocytes, occasional bacteria, innumerable WBCs, negative nitrites  · Urine culture pending continue to trend  · Continue Rocephin 1 g IV Q 24 hours      Mixed hyperlipidemia  Assessment & Plan  · Patient has elevated total cholesterol including elevated LDL and elevated triglycerides  · Will initiate statin once patient passes swallow study  Stenosis of left internal carotid artery  Assessment & Plan  · Vascular surgery consult placed at the request of Neurology        VTE Pharmacologic Prophylaxis:   Pharmacologic: Heparin  Mechanical VTE Prophylaxis in Place: Yes    Patient Centered Rounds: I have performed bedside rounds with nursing staff today  Discussions with Specialists or Other Care Team Provider:  Neurology    Education and Discussions with Family / Patient:  Daughter-in-law bedside    Time Spent for Care: 30 minutes  More than 50% of total time spent on counseling and coordination of care as described above  Current Length of Stay: 1 day(s)    Current Patient Status: Inpatient   Certification Statement: The patient will continue to require additional inpatient hospital stay due to Ongoing need for acute encephalopathy stroke workup aphasia    Discharge Plan:  Not medically cleared    Code Status: Level 3 - DNAR and DNI      Subjective:   Patient remains nonverbal aphasic appears to look use mild Chrissy reports minimal improvement patient does have a baseline dementia  Discussion with daughter along bedside pending MRI echocardiogram further need son here so he can translate with an fell out the MRI form to complete of the brain  Questions and concerns answered at this time    Objective:     Vitals:   Temp (24hrs), Av 1 °F (36 7 °C), Min:97 2 °F (36 2 °C), Max:99 5 °F (37 5 °C)    Temp:  [97 2 °F (36 2 °C)-99 5 °F (37 5 °C)] 98 4 °F (36 9 °C)  HR:  [58-94] 65  Resp:  [18-27] 20  BP: (100-191)/() 100/63  SpO2:  [95 %-98 %] 96 %  Body mass index is 23 34 kg/m²  Input and Output Summary (last 24 hours): Intake/Output Summary (Last 24 hours) at 2019 1523  Last data filed at 2019 0344  Gross per 24 hour   Intake 900 ml   Output 100 ml   Net 800 ml       Physical Exam:     Physical Exam   HENT:   Head: Normocephalic and atraumatic  Eyes: Conjunctivae and EOM are normal    Neck: Normal range of motion  Cardiovascular: Normal rate, regular rhythm and normal heart sounds  Pulmonary/Chest: Effort normal and breath sounds normal    Abdominal: Soft  Bowel sounds are normal    Neurological: She is alert  Aphasic right upper extremity weakness near flaccid   Skin: Skin is warm and dry  Psychiatric: She has a normal mood and affect  Her behavior is normal          Additional Data:     Labs:    Results from last 7 days   Lab Units 08/27/19  0441   WBC Thousand/uL 11 82*   HEMOGLOBIN g/dL 12 0   HEMATOCRIT % 36 7   PLATELETS Thousands/uL 168   NEUTROS PCT % 65   LYMPHS PCT % 22   MONOS PCT % 11   EOS PCT % 1     Results from last 7 days   Lab Units 08/27/19  0441   SODIUM mmol/L 137   POTASSIUM mmol/L 3 8   CHLORIDE mmol/L 103   CO2 mmol/L 27   BUN mg/dL 14   CREATININE mg/dL 0 91   ANION GAP mmol/L 7   CALCIUM mg/dL 8 7   ALBUMIN g/dL 2 9*   TOTAL BILIRUBIN mg/dL 0 50   ALK PHOS U/L 56   ALT U/L 11*   AST U/L 21   GLUCOSE RANDOM mg/dL 128     Results from last 7 days   Lab Units 08/26/19  1204   INR  1 02     Results from last 7 days   Lab Units 08/27/19  1157 08/27/19  0607 08/27/19  0018 08/26/19  2116 08/26/19  1727   POC GLUCOSE mg/dl 166* 134 125 142* 165*     Results from last 7 days   Lab Units 08/26/19  1204   HEMOGLOBIN A1C % 7 1*               * I Have Reviewed All Lab Data Listed Above  * Additional Pertinent Lab Tests Reviewed:  Da [address] Admission Reviewed    Imaging:    Imaging Reports Reviewed Today Include:  MRI pending      Recent Cultures (last 7 days):           Last 24 Hours Medication List:     Current Facility-Administered Medications:  aspirin 81 mg Oral Daily Fozia Sender, CRNP    atorvastatin 40 mg Oral Daily With Dinner Fozia Sender, CRNP    cefTRIAXone 1,000 mg Intravenous Q24H Ashleigh Lopez MD Last Rate: 1,000 mg (08/27/19 1321)   heparin (porcine) 5,000 Units Subcutaneous Q12H [address] Piter Patel MD    hydrALAZINE 10 mg Intravenous Q6H PRN Ashleigh Lopez MD    hydrALAZINE 5 mg Intravenous Once Kathy Meadows MD    insulin lispro 1-5 Units Subcutaneous Q6H [address] Piter Patel MD    ondansetron 4 mg Intravenous Q4H PRN Ashleigh Lopez MD    sodium chloride 75 mL/hr Intravenous Continuous Ashleigh Lopez MD Last Rate: 75 mL/hr (08/27/19 0344) Today, Patient Was Seen By: LENA Galdamez    ** Please Note: Dictation voice to text software may have been used in the creation of this document   **

## 2019-08-27 NOTE — ASSESSMENT & PLAN NOTE
· Patient found to be in new onset AFib during evaluation in the ED  · Continue telemetry monitoring  · Continues to be rate controlled  · Cardiology consult pending  · Consider anticoagulation in setting of new onset AFib with stroke risk CHADS2-VASc2 score-6    · Cardiology recommendations appreciated

## 2019-08-27 NOTE — CONSULTS
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  MRI brain pending  Neurology following  2- New onset atrial fibrillation, rate controlled off AV terry blockers, HR 50-60s  TTE shows EF 60% without regional wall motion abnormalities or diastolic dysfunction, LVH, mild MR, moderate AS, mild TR, trace to mild OR, no evidence of thrombus  RAFY may be needed to better evaluate for cardioembolic source but will defer to neurology/vascular surgery for recommendations after MRI brain is performed  Patient may not be a candidate for anticoagulation given advanced age and fall risk  Reasonable to continue ASA unless otherwise contraindicated  CHADS2 Vasc score 5 (age, gender, htn, vascular disease)  3- Bilateral carotid artery stenosis  CT head neck shows 70% stenosis of left internal carotid artery and 40% stenosis of right internal carotid artery  Vascular surgery evaluating for possible intervention  4- Hypertension  Permissive hypertension per Neurology team    5- Hyperlipidemia  Started on atorvastatin 40 mg daily  History of Present Illness   Physician Requesting Consult: Kerrie Pedraza MD  Reason for Consult / Principal Problem:   HPI: [facility] is a 80y o  year old Italian speaking female who presents with right-sided weakness and aphasia beyond her baseline for the past 3 days  History was provided by daughter-in-law at bedside as patient could not elicit a history today  States that her mother was in her usual state of held until 2 days ago when she sustained a fall to her right side and trunk   Since then, she has been increasingly weak on her right upper and lower extremities but had been verbal until yesterday morning when she appeared very confused and unable to answer questions appropriately and was brought in to the ED for further evaluation  She was found to be in predominantly rate controlled atrial fibrillation on telemetry which is new for her and hypertensive with /80  Per daughter she denies any symptoms of chest pain, discomfort, palpitations, shortness of breath  She denies any acute weight gain or loss, pain or swelling in the extremities  Inpatient consult to Cardiology  Consult performed by: Devorah Patel PA-C  Consult ordered by: Apple Turcios MD        EKG:  Atrial fibrillation with slow ventricular response, nonspecific ST and T-wave abnormalities  Review of systems:  Unable to assess at this time  Historical Information   Past Medical History:   Diagnosis Date    Dementia      History reviewed  No pertinent surgical history  Social History     Substance and Sexual Activity   Alcohol Use Never    Frequency: Never     Social History     Substance and Sexual Activity   Drug Use Never     Social History     Tobacco Use   Smoking Status Never Smoker   Smokeless Tobacco Never Used       Family History: History reviewed  No pertinent family history      Meds/Allergies   current meds:   Current Facility-Administered Medications   Medication Dose Route Frequency    aspirin chewable tablet 81 mg  81 mg Oral Daily    atorvastatin (LIPITOR) tablet 40 mg  40 mg Oral Daily With Dinner    ceftriaxone (ROCEPHIN) 1 g/50 mL in dextrose IVPB  1,000 mg Intravenous Q24H    heparin (porcine) subcutaneous injection 5,000 Units  5,000 Units Subcutaneous Q12H [facility]    hydrALAZINE (APRESOLINE) injection 10 mg  10 mg Intravenous Q6H PRN    hydrALAZINE (APRESOLINE) injection 5 mg  5 mg Intravenous Once    insulin lispro (HumaLOG) 100 units/mL subcutaneous injection 1-5 Units  1-5 Units Subcutaneous Q6H [facility]    ondansetron (ZOFRAN) injection 4 mg  4 mg Intravenous Q4H PRN    sodium chloride 0 9 % infusion  75 mL/hr Intravenous Continuous     No Known Allergies    Objective   Vitals: Blood pressure 126/79, pulse 73, temperature 98 2 °F (36 8 °C), resp  rate 18, height 5' (1 524 m), weight 54 2 kg (119 lb 7 8 oz), SpO2 96 %  , Body mass index is 23 34 kg/m² ,   Orthostatic Blood Pressures      Most Recent Value   Blood Pressure  126/79 filed at 08/27/2019 1056   Patient Position - Orthostatic VS  Lying filed at 08/27/2019 5961          Systolic (15YHI), XLF:455 , Min:119 , GTU:715     Diastolic (72IRG), AJF:81, Min:67, Max:124        Intake/Output Summary (Last 24 hours) at 8/27/2019 1122  Last data filed at 8/27/2019 0344  Gross per 24 hour   Intake 900 ml   Output 100 ml   Net 800 ml       Invasive Devices     Peripheral Intravenous Line            Peripheral IV 08/26/19 Right Antecubital 1 day    Peripheral IV 08/26/19 Right Wrist less than 1 day          Drain            External Urinary Catheter 1 day                    Physical Exam:  GEN: Alert and oriented x 3, in no acute distress  Well appearing and well nourished  HEENT: Sclera anicteric, conjunctivae pink, mucous membranes moist  Oropharynx clear  NECK: Supple, no carotid bruits, no significant JVD  Trachea midline, no thyromegaly  HEART:  Irregularly irregular rhythm, normal S1 and S2, no murmurs, clicks, gallops or rubs  PMI nondisplaced, no thrills  LUNGS: Clear to auscultation bilaterally; no wheezes, rales, or rhonchi  No increased work of breathing or signs of respiratory distress  ABDOMEN: Soft, nontender, nondistended, normoactive bowel sounds  EXTREMITIES: Skin warm and well perfused, no clubbing, cyanosis, or edema  NEURO: No focal findings  Normal speech  Mood and affect normal    SKIN: Normal without suspicious lesions on exposed skin        Lab Results:     Troponins:   Results from last 7 days   Lab Units 08/26/19  1204   TROPONIN I ng/mL <0 02       CBC with diff:   Results from last 7 days   Lab Units 08/27/19  0441 08/26/19  1720 08/26/19  1204   WBC Thousand/uL 11 82*  --  12 87*   HEMOGLOBIN g/dL 12 0  --  12 9   HEMATOCRIT % 36 7  --  39 1   MCV fL 93  --  93   PLATELETS Thousands/uL 168 190 143*   MCH pg 30 5  --  30 6   MCHC g/dL 32 7  --  33 0   RDW % 13 1  --  13 0   MPV fL 11 6 11 2 12 1   NRBC AUTO /100 WBCs 0  --  0         CMP:   Results from last 7 days   Lab Units 08/27/19  0441 08/26/19  1204   POTASSIUM mmol/L 3 8 3 5   CHLORIDE mmol/L 103 100   CO2 mmol/L 27 29   BUN mg/dL 14 17   CREATININE mg/dL 0 91 1 19   CALCIUM mg/dL 8 7 9 3   AST U/L 21 17   ALT U/L 11* 14   ALK PHOS U/L 56 65   EGFR ml/min/1 73sq m 55 40

## 2019-08-27 NOTE — UTILIZATION REVIEW
Initial Clinical Review    Admission: Date/Time/Statement: Inpatient Admission Orders (From admission, onward)     Ordered        08/26/19 1447  Inpatient Admission (expected length of stay for this patient Order details is greater than two midnights)  Once                   Orders Placed This Encounter   Procedures    Inpatient Admission (expected length of stay for this patient Order details is greater than two midnights)     Standing Status:   Standing     Number of Occurrences:   1     Order Specific Question:   Admitting Physician     Answer:   Cindy Caputo     Order Specific Question:   Level of Care     Answer:   Med Surg [16]     Order Specific Question:   Estimated length of stay     Answer:   More than 2 Midnights     Order Specific Question:   Certification     Answer:   I certify that inpatient services are medically necessary for this patient for a duration of greater than two midnights  See H&P and MD Progress Notes for additional information about the patient's course of treatment  ED Arrival Information     Expected Arrival Acuity Means of Arrival Escorted By Service Admission Type    - 8/26/2019 10:29 Emergent Wheelchair Family Member General Medicine Emergency    Arrival Complaint    FALL-LEG INJURY        Chief Complaint   Patient presents with    Fall     Family states"patient fell on the carpet at home yesterday and then fell this morning in the bathroom in the tub"  Patient not taking blood thinners  No loss of consciousness  Assessment/Plan:   Juan Hall is a 80 y o  female patient with no known past medical history who presented from home with her son and her daughter-in-law due to a complaint of a fall she sustained 2 days ago on her right side with now near-total neglect of her entire right side of her body with associated aphasia, and altered mental status    According to the patient's son, he states that his mother was in her usual state of health until 2 days ago on Saturday when she fell on her right side on the edge of the bathtub  Afterwards, he states that she was okay except for some pain on the right side  However she spent the whole day Sunday in bed and was able to eat all of her meals and have normal conversations  He then states that this morning she was more confused than normal and was not talking or answering the questions  She also had entire weakness of her right upper and right lower extremities  Upon presentation to the ED, she was found to be in new onset atrial fibrillation with accelerated hypertension and SBPs >220s  She was also found to have an elevated blood sugar of 253 under possible UTI  A CT of the head without contrast was negative except for old chronic infarcts and a CTA of the head and neck with and without contrast was also negative for any acute findings except for a 70% stenosis in the left ICA  She was given a dose of IV Rocephin and a bolus of 1 L of normal saline  She was also treated with IV hydralazine 5 mg x2 in an attempt to bring down her blood pressure  She has been seen and evaluated by Neurology and they recommend admitting her under stroke pathway for further workup      * Acute encephalopathy  Assessment & Plan  Patient presents with the acute onset of encephalopathy as noticed by her son and daughter-in-law for the past 2 days after a fall  Differential diagnoses include:  Stroke vs UTI vs hypertensive encephalopathy vs seizure vs metabolic abnormalities e g  Hyperglycemia  Most likely contributors to patient's acute encephalopathy is her advanced age and possible advanced dementia with superimposed evolving stroke and UTI  Other contributing factors are the hyperglycemia and accelerated hypertension  Patient will be admitted to the step-down unit and placed on the stroke pathway  CT head without contrast as well as CTA of head and neck with and without contrast negative for any acute findings    As per Neurology, will order MRI brain  Treat UTI with antibiotics  [place] sliding scale insulin protocol to deal with hyperglycemia and check hemoglobin A1c  Adequately treat accelerated hypertension with prn hydralazine  Appreciate Neurology consult and recommendations  Continue frequent neuro checks      Aphasia  Assessment & Plan  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  CTA head and neck with and without contrast showed similar findings on CT of head without contrast along with greater than 70% luminal constriction of left ICA at the origin approximately 40% stenosis at the origin of the right ICA  Patient seen and evaluated by Neurology  Suspicion for stroke is high given acuity of symptoms and complete right sided weakness in both upper lower extremities in the context of new onset Afib and left ICA findings  Patient has been placed on stroke pathway  Continue frequent neuro checks  Continue telemetry  Will keep patient NPO, perform swallow study and if patient fails swallow study coma will institute rectal aspirin but if she passes the swallow study will start oral aspirin and statin  Check MRI brain, 2D echo  Check hemoglobin A1c  As per Neurology recommendations, vascular surgery consult has been placed for recommendations regarding left ICA stenosis of 70%  Continue permissive hypertension to maintain SBP at 140-160  PT/OT, PM&R consult      Ambulatory dysfunction  Assessment & Plan  Patient presented with ambulatory dysfunction secondary to ataxia from possible stroke  Patient had a witnessed fall 2 days ago in her bathroom by her daughter-in-law  Will institute fall precautions  PT/OT      New onset atrial fibrillation [facility])  Assessment & Plan  Found to be in new onset AFib during evaluation in the ED    Patient is currently in rate controlled AFib   Continue telemetry monitoring  Cardiology consult placed  Consideration to be given for anticoagulation given possible stroke  CHADS2-VASc2 score-6      Accelerated hypertension  Assessment & Plan  Patient presented with accelerated hypertension with SBP in the 220s  Attempts have been made to control patient's systolic BP with p r n  Doses of IV hydralazine  However if these are not successful, will consider placing patient on step-down and initiating Cardene drip until we can get SBP down to 140-160  Will need to allow permissive hypertension in the setting of presumed stroke to prevent cerebral ischemia      Hyperglycemia  Assessment & Plan  Patient has no known history of type 2 diabetes  Presents with hyperglycemia with blood sugar at 253  Will initiate sliding scale insulin coverage, and will advance to diabetic diet once patient passes a swallow study  Will check a hemoglobin A1c  Accu-Cheks Q a c  And HS     Asymptomatic bacteriuria  Assessment & Plan  Patient presented with urinalysis showing moderate leukocytes, occasional bacteria, innumerable WBCs, negative nitrites  Patient has been asymptomatic however in the setting of acute encephalopathy and recent fall will treat patient for presumed UTI  Continue Rocephin 1 g IV Q 24 hours  Follow up urine cultures      Mixed hyperlipidemia  Assessment & Plan  Patient has elevated total cholesterol including elevated LDL and elevated triglycerides    Will initiate statin once patient passes swallow study      Stenosis of left internal carotid artery  Assessment & Plan  Vascular surgery consult placed at the request of Neurology       VTE Prophylaxis: Heparin  / sequential compression device   Code Status:  DNR/DNI    ED Triage Vitals   Temperature Pulse Respirations Blood Pressure SpO2   08/26/19 1035 08/26/19 1035 08/26/19 1035 08/26/19 1035 08/26/19 1035   97 9 °F (36 6 °C) 67 18 (!) 199/82 97 %      Temp Source Heart Rate Source Patient Position - Orthostatic VS BP Location FiO2 (%)   08/26/19 1035 08/26/19 1035 08/26/19 1100 08/26/19 1900 --   Oral Monitor Lying Left arm       Pain Score       08/26/19 1035       No Pain        Wt Readings from Last 1 Encounters:   08/26/19 54 2 kg (119 lb 7 8 oz)     Additional Vital Signs:   08/27/19 10:56:14  98 2 °F (36 8 °C)  73    126/79  95  96 %     08/27/19 0700              None (Room air)   08/27/19 06:58:05  97 2 °F (36 2 °C)Abnormal   67    125/79  94  95 %     08/27/19 0330    79  18  120/79         08/27/19 0130  97 3 °F (36 3 °C)Abnormal   58  18  155/81         08/26/19 2346        148/78         08/26/19 2330  97 8 °F (36 6 °C)  60  19  169/95         08/26/19 2130  97 9 °F (36 6 °C)  64  19  178/86Abnormal     97 %     08/26/19 1930  97 9 °F (36 6 °C)  65  19  180/86Abnormal     96 %  None (Room air)   08/26/19 1900  98 2 °F (36 8 °C)  67  19  180/93Abnormal   122  95 %  None (Room air)   08/26/19 18:51:48  98 2 °F (36 8 °C)  69    191/100Abnormal   130  97 %     08/26/19 17:40:24  98 3 °F (36 8 °C)  66    119/88  98  98 %     08/26/19 16:42:26  99 5 °F (37 5 °C)  94  22  168/100  123  97 %     08/26/19 1600    75  23Abnormal   125/82         08/26/19 1545    85  27Abnormal   146/67    97 %     08/26/19 1530    79  20      97 %     08/26/19 1515    78  21  228/109Abnormal     95 %     08/26/19 1500    62  24Abnormal   223/102Abnormal     95 %     08/26/19 1445    62  25Abnormal   206/87Abnormal     95 %     08/26/19 1430    64  23Abnormal   208/86Abnormal     96 %     08/26/19 1420        202/88Abnormal           08/26/19 1415    63  22  180/86Abnormal     95 %     08/26/19 1400    60  23Abnormal   180/86Abnormal     97 %     08/26/19 1345    64  25Abnormal   201/84Abnormal     94 %     08/26/19 1330    62  23Abnormal   207/90Abnormal     95 %     08/26/19 1315    65  21  211/85Abnormal     95 %     08/26/19 13:10:23   61  45Abnormal       95 %     08/26/19 13:05:24    61  56Abnormal       96 %     08/26/19 1300    66  24Abnormal   190/80Abnormal     95 %     08/26/19 12:59:53    60  40Abnormal       96 %     08/26/19 1257        172/85Abnormal          08/26/19 1245    55  23Abnormal   172/85Abnormal     98 %     08/26/19 12:43:23    55  50Abnormal       98 %     08/26/19 12:40:23    63  40Abnormal       97 %     08/26/19 12:35:23    65  28Abnormal       98 %     08/26/19 12:30:23    61  38Abnormal       95 %     08/26/19 1230    61  28Abnormal   196/80Abnormal     95 %     08/26/19 12:25:23    65  34Abnormal            08/26/19 12:19:43    59  31Abnormal            08/26/19 1216        191/124Abnormal          08/26/19 12:15:23    61  32Abnormal            08/26/19 1215    61  22  191/124Abnormal     97 %     08/26/19 12:10:23    60  39Abnormal            08/26/19 12:05:23    58  31Abnormal            08/26/19 12:00:23    62  40Abnormal            08/26/19 1200    62  20  190/120Abnormal     96 %     08/26/19 11:55:23    63  32Abnormal            08/26/19 11:50:23    65  33Abnormal            08/26/19 11:48:43            96 %     08/26/19 11:45:23    58  24Abnormal   160/114Abnormal     96 %     08/26/19 1145    58  22  160/114Abnormal     96 %     08/26/19 11:40:23    60  27Abnormal       96 %     08/26/19 11:34:53    68  34Abnormal       86 %Abnormal      08/26/19 1132        167/104Abnormal          08/26/19 11:30:23    61  22  167/104Abnormal     97 %     08/26/19 1130    61  22  167/104Abnormal     97 %     08/26/19 11:29:33            85 %Abnormal      08/26/19 11:25:23    57  35Abnormal       96 %     08/26/19 1125        198/108Abnormal          08/26/19 11:20:23    61  20      96 %     08/26/19 11:15:23    63  20      97 %     08/26/19 1115    57  20  198/108Abnormal   97 %     08/26/19 11:10:22    63        98 %     08/26/19 11:05:02    60        97 %     08/26/19 1103        199/81Abnormal          08/26/19 1100    63  18  199/81Abnormal     97 %     08/26/19 10:53:22    64        96 %     08/26/19 10:50:22    66        96 %     08/26/19 1045    67  18  199/82Abnormal     97 %       Pertinent Labs/Diagnostic Test Results:     8/26 CT HEAD - No acute intracranial process  No skull fracture  Chronic microangiopathy and 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  8/26 CTA HEAD, NECK - Small chronic cortical infarction left frontal vertex, consistent with CT  Greater than 70% luminal constriction at the origin of the left internal carotid artery  Approximately 40% stenosis at the origin of the right internal carotid artery  No additional evidence of critical stenosis, dissection or occlusion involving remaining cervical carotid or vertebral segments or visualized cerebral arteries  Advanced multilevel degenerative cervical spondylosis     8/26 CT RECON C SPINE - Advanced multilevel degenerative spondylosis  No acute cervical spine fracture or traumatic malalignment      8/26 MRI BRAIN - PENDING     8/27 CARDIAC ECHO - PENDING     Results from last 7 days   Lab Units 08/27/19  0441 08/26/19  1720 08/26/19  1204   WBC Thousand/uL 11 82*  --  12 87*   HEMOGLOBIN g/dL 12 0  --  12 9   HEMATOCRIT % 36 7  --  39 1   PLATELETS Thousands/uL 168 190 143*   NEUTROS ABS Thousands/µL 7 60  --  9 40*     Results from last 7 days   Lab Units 08/27/19  0441 08/26/19  1204   SODIUM mmol/L 137 138   POTASSIUM mmol/L 3 8 3 5   CHLORIDE mmol/L 103 100   CO2 mmol/L 27 29   ANION GAP mmol/L 7 9   BUN mg/dL 14 17   CREATININE mg/dL 0 91 1 19   EGFR ml/min/1 73sq m 55 40   CALCIUM mg/dL 8 7 9 3   MAGNESIUM mg/dL 2 0  --    PHOSPHORUS mg/dL 3 6  --      Results from last 7 days   Lab Units 08/27/19  0441 08/26/19  1204   AST U/L 21 17   ALT U/L 11* 14 ALK PHOS U/L 56 65   TOTAL PROTEIN g/dL 6 8 7 6   ALBUMIN g/dL 2 9* 3 3*   TOTAL BILIRUBIN mg/dL 0 50 0 60   BILIRUBIN DIRECT mg/dL  --  0 20     Results from last 7 days   Lab Units 08/27/19  1157 08/27/19  0607 08/27/19  0018 08/26/19  2116 08/26/19  1727   POC GLUCOSE mg/dl 166* 134 125 142* 165*     Results from last 7 days   Lab Units 08/27/19  0441 08/26/19  1204   GLUCOSE RANDOM mg/dL 128 253*     Results from last 7 days   Lab Units 08/26/19  1204   HEMOGLOBIN A1C % 7 1*   EAG mg/dl 157     Results from last 7 days   Lab Units 08/26/19  1204   TROPONIN I ng/mL <0 02     Results from last 7 days   Lab Units 08/26/19  1204   PROTIME seconds 13 4   INR  1 02   PTT seconds 21*     Results from last 7 days   Lab Units 08/26/19  1244   CLARITY UA  Cloudy   COLOR UA  Yellow   SPEC GRAV UA  >=1 030   PH UA  6 0   GLUCOSE UA mg/dl Negative   KETONES UA mg/dl Trace*   BLOOD UA  Moderate*   PROTEIN UA mg/dl 100 (2+)*   NITRITE UA  Negative   BILIRUBIN UA  Negative   UROBILINOGEN UA E U /dl 1 0   LEUKOCYTES UA  Moderate*   WBC UA /hpf Innumerable*   RBC UA /hpf None Seen   BACTERIA UA /hpf Occasional   EPITHELIAL CELLS WET PREP /hpf None Seen     ED Treatment:   Medication Administration from 08/26/2019 1029 to 08/26/2019 1618    Date/Time Order Dose Route Action   08/26/2019 1252 iodixanol (VISIPAQUE) 320 MG/ML injection 85 mL 85 mL Intravenous Given   08/26/2019 1316 sodium chloride 0 9 % bolus 1,000 mL 1,000 mL Intravenous New Bag   08/26/2019 1320 ceftriaxone (ROCEPHIN) 1 g/50 mL in dextrose IVPB 1,000 mg Intravenous New Bag   08/26/2019 1420 hydrALAZINE (APRESOLINE) injection 5 mg 5 mg Intravenous Given   08/26/2019 1524 hydrALAZINE (APRESOLINE) injection 10 mg 5 mg Intravenous Given        Past Medical History:   Diagnosis Date    Dementia      Present on Admission:   Acute encephalopathy   Aphasia   Ambulatory dysfunction   New onset atrial fibrillation (HCC)   Accelerated hypertension   Hyperglycemia   Asymptomatic bacteriuria   Mixed hyperlipidemia   Stenosis of left internal carotid artery    Admitting Diagnosis: Aphasia [R47 01]  Altered mental status [R41 82]  Leg injury [S89 90XA]  Hyperglycemia [R73 9]  New onset atrial fibrillation (HCC) [I48 91]  Acute encephalopathy [G93 40]  Right sided weakness [R53 1]  Stenosis of left internal carotid artery [I65 22]     Age/Sex: 80 y o  female     Admission Orders:    Current Facility-Administered Medications:  aspirin 81 mg Oral Daily    atorvastatin 40 mg Oral Daily With Dinner    cefTRIAXone 1,000 mg Intravenous Q24H Last Rate: 1,000 mg (08/27/19 1321)   heparin (porcine) 5,000 Units Subcutaneous Q12H [address]    hydrALAZINE 10 mg Intravenous Q6H PRN X1 8/26   hydrALAZINE 5 mg Intravenous Once    insulin lispro 1-5 Units Subcutaneous Q6H MOE    ondansetron 4 mg Intravenous Q4H PRN    sodium chloride 75 mL/hr Intravenous Continuous Last Rate: 75 mL/hr (08/27/19 0344)     TELE  SCDs  UP W/ ASSIST   BASELINE NIHSS   Neuro checks Every 1 hour x 4 hours, then every 2 hours x 4, then every 4 hours x 72 hours                 POC GLUCOSE Q 6 HR  DYSPHAGIA DIET   MRI BRAIN   URINE CULTURE  PT/OT/ST EVAL/TX   IP CONSULT TO NEUROLOGY  IP CONSULT TO VASCULAR SURGERY  IP CONSULT TO CARDIOLOGY  IP CONSULT TO PHYSICAL MEDICINE REHAB  IP CONSULT TO CASE MANAGEMENT  IP CONSULT TO [address] [place] [address] Utilization Review Department  Phone: [phone]; Fax [phone]  [email or web address]  ATTENTION: Please call with any questions or concerns to [phone]  and carefully listen to the prompts so that you are directed to the right person  Send all requests for admission clinical reviews, approved or denied determinations and any other requests to fax [phone]   All voicemails are confidential

## 2019-08-27 NOTE — SPEECH THERAPY NOTE
Speech-Language Pathology Bedside Swallow Evaluation      Patient Name: Stefanie Acosta    SGYWN'W Date: 8/27/2019     Problem List  Patient Active Problem List   Diagnosis    Acute encephalopathy    Aphasia    Ambulatory dysfunction    New onset atrial fibrillation (HCC)    Accelerated hypertension    Hyperglycemia    Asymptomatic bacteriuria    Mixed hyperlipidemia    Stenosis of left internal carotid artery       Past Medical History  Past Medical History:   Diagnosis Date    Dementia        Past Surgical History  History reviewed  No pertinent surgical history  Summary   Pt presented with functional appearing oral and pharyngeal stage swallowing skills with puree and honey thick liquid  Recommendations: puree/level 1 diet and honey thick liquids     Recommended Form of Meds: crushed with puree     Aspiration precautions and compensatory swallowing strategies: upright posture, only feed when fully alert and slow rate of feeding          Current Medical Status  Stefanie Acosta is a 80 y o  female patient with no known past medical history who presented from home with her son and her daughter-in-law due to a complaint of a fall she sustained 2 days ago on her right side with now near-total neglect of her entire right side of her body with associated aphasia, and altered mental status  According to the patient's son, he states that his mother was in her usual state of health until 2 days ago on Saturday when she fell on her right side on the edge of the bathtub  Afterwards, he states that she was okay except for some pain on the right side  However she spent the whole day Sunday in bed and was able to eat all of her meals and have normal conversations  He then states that this morning she was more confused than normal and was not talking or answering the questions  She also had entire weakness of her right upper and right lower extremities    Upon presentation to the ED, she was found to be in new onset atrial fibrillation with accelerated hypertension and SBPs >220s  She was also found to have an elevated blood sugar of 253 under possible UTI  DX:  Acute encephalopathy, r/o CVA (awating MRI), aphasia, ambulatory dysfunction, new onset afib, accelerated htn, hyperglycemia, asymptomatic bacteriuria, mixed hyperlipidemia, L ICA stenosis  Pt is currently NPO  Swallowing Evaluation requested and completed bedside  Dtr-in-law present  Past medical history:  Please see H&P for details    Special Studies:  MRI pending    Social/Education/Vocational Hx:  Pt lives with son and dtr-in-law      Swallow Information   Current Risks for Dysphagia & Aspiration: r/o CVA     Current Diet: NPO      Baseline Diet: regular diet (selects soft food as she has v few teeth) and thin liquids      Baseline Assessment   Behavior/Cognition: alert    Speech/Language Status: not able to follow commands and no verbal output noted    Patient Positioning: upright in bed    Pain Status/Interventions/Response to Interventions:  No report of or nonverbal indications of pain  Swallow Mechanism Exam     Facial: right facial droop  Labial: WFL  Lingual: unable tofully  test 2/2 limited command following but able reflexive lip licking with good ROM noted  Velum: symmetrical  Mandible: adequate ROM  Dentition: limited dentition  Vocal quality:no voicing produced or elicited   Volitional Cough: unable to initiate volitional cough     Consistencies Assessed and Performance   Consistencies Administered: thin liquids, nectar thick, honey thick, puree    Oral Stage: mild impairment with limited material suspected  Adeqaute retrieval by tsp, cup and straw  Bolus formation and transfer were functional with no significant oral residue noted    Oral control suspected to be mildly impaired (risk for spillage anteriorly and posteriorly with the thinnest of materials)    Pharyngeal Stage: mild mpairment with limited material suspected  Swallow Mechanics:  Swallowing initiation appeared prompt to minimally delayed  Laryngeal rise was palpated and judged to be within functional limits  No coughing, throat clearing, change in BS or respiratory status noted with intek of puree and honey thick liquids  Occassional throat clearing noted with nectar thick and thin liquid and occasion of O2 desaturation noted with thin liquid by successive sips (followed by increased RR)  Esophageal Concerns: none reported    Strategies and Efficacy: fed slwoly when fully alert & upright    Summary and Recommendations (see above)    Results Reviewed with: patient, RN, CRNP and family     Treatment Recommended: yes    Frequency of treatment: min of 3x weekly    Patient Stated Goal: unable to state    Dysphagia LTG per SLP  -Patient will demonstrate optimum safety and efficacy of oral intake and swallowing function without overt s/sx of penetration or aspiration for the highest appropriate diet level       STG:  Diagnostic Treatment  -Patient will tolerate current diet and diet upgrade trials with no significant signs/symptoms of oral or pharyngeal dysphagia including aspiration across 1-3 diagnostic sessions     -If indicated, Patient will comply with/complete a Video/Modified Barium Swallow study to allow for more complete assessment of anatomy and physiology of the oral and pharyngeal stage swallowing skills and best guide treatment plan

## 2019-08-27 NOTE — ASSESSMENT & PLAN NOTE
· Patient has no known history of type 2 diabetes    · Patient with hyperglycemia on admission however A1c noted to be 7 1  · Continue home regimen upon discharge

## 2019-08-27 NOTE — PHYSICAL THERAPY NOTE
Physical Therapy Evaluation     Patient's Name: Tony Bowers    Admitting Diagnosis  Aphasia [R47 01]  Altered mental status [R41 82]  Leg injury [S89 90XA]  Hyperglycemia [R73 9]  New onset atrial fibrillation ([address] ) [I48 91]  Acute encephalopathy [G93 40]  Right sided weakness [R53 1]  Stenosis of left internal carotid artery [I65 22]    Problem List  Patient Active Problem List   Diagnosis    Acute encephalopathy    Aphasia    Ambulatory dysfunction    New onset atrial fibrillation ([address] )    Accelerated hypertension    Hyperglycemia    Asymptomatic bacteriuria    Mixed hyperlipidemia    Stenosis of left internal carotid artery       Past Medical History  Past Medical History:   Diagnosis Date    Dementia        Past Surgical History  History reviewed  No pertinent surgical history  08/27/19 1030   Note Type   Note type Eval only   Pain Assessment   Pain Assessment FLACC   Pain Rating: FLACC (Rest) - Face 0   Pain Rating: FLACC (Rest) - Legs 0   Pain Rating: FLACC (Rest) - Activity 0   Pain Rating: FLACC (Rest) - Cry 0   Pain Rating: FLACC (Rest) - Consolability 0   Score: FLACC (Rest) 0   Pain Rating: FLACC (Activity) - Face 1   Pain Rating: FLACC (Activity) - Legs 0   Pain Rating: FLACC (Activity) - Activity 0   Pain Rating: FLACC (Activity) - Cry 0   Pain Rating: FLACC (Activity) - Consolability 0   Score: FLACC (Activity) 1   Home Living   Type of Home House   Home Layout Two level;Elevator;Performs ADLs on one level; Able to live on main level with bedroom/bathroom; Access   Bathroom Shower/Tub Tub/shower unit  (DIL A with bed bath as needed)   Bathroom Toilet Standard   Bathroom Equipment Shower chair   Bathroom Accessibility Accessible   Home Equipment Walker;Cane;Wheelchair-manual;Quad cane  (QC; DIL reports no DME used at baseline)   Additional Comments (-) ; family provides transportation   Prior Function   Level of [place] Needs assistance with ADLs and functional mobility  (DIL reports indep  with ambulation)   Lives With Son;Family  (DIL)   Receives Help From Family   ADL Assistance Needs assistance   IADLs Needs assistance   Falls in the last 6 months 1 to 4   Vocational Retired   Restrictions/Precautions   [employer] [place] Bearing Precautions Per Order No   Braces or Orthoses Other (Comment)  (none per DIL)   Other Precautions Cognitive; Fall Risk;Telemetry; Bed Alarm  (no verbal output noted)   General   Family/Caregiver Present Yes  (DIL)   Cognition   Overall Cognitive Status Impaired   Arousal/Participation Alert   Orientation Level Unable to assess   Memory Unable to assess   Following Commands Unable to follow one step commands   Comments not able to follow commands and no verbal output noted   RUE Assessment   RUE Assessment X   RUE Strength   RUE Overall Strength Deficits   LUE Assessment   LUE Assessment X   LUE Strength   LUE Overall Strength Deficits  (spontaneous and purposeful movement of LUE noted)   RLE Assessment   RLE Assessment X   Strength RLE   RLE Overall Strength   (Deficits; unable to follow commands )   LLE Assessment   LLE Assessment X   Strength LLE   LLE Overall Strength   (Deficits; unable to follow commands; minimal purposeful movement noted)   Coordination   Movements are Fluid and Coordinated 0   Sensation   (Unable to assess)   Bed Mobility   Rolling R 2  Maximal assistance   Additional items Assist x 2; Increased time required;Verbal cues;LE management; Bedrails   Rolling L 2  Maximal assistance   Additional items Assist x 2; Increased time required;Verbal cues;LE management   Additional Comments unable to follow commands at this time   Transfers   Sit to Stand Unable to assess   Ambulation/Elevation   Gait pattern Not appropriate; Not tested   Balance   Static Sitting   (Unable to assess)   Endurance Deficit   Endurance Deficit Yes   Activity Tolerance   Activity Tolerance Other (Comment)  (inability to follow commands)   Medical Staff Made Aware OT [id number]   Nurse Made Aware RN Sean Regalado confirmed pt appropriate for PT eval; post-session: pt repositioned for comfort, all needs within reach and bed alarm engaged   Assessment   Prognosis Good   Problem List Decreased strength;Decreased range of motion;Decreased endurance;Decreased mobility; Decreased coordination;Decreased cognition   Assessment Pt is 80 y o  female seen for PT evaluation s/p admit to [facility] on 8/26/2019 w/ Acute encephalopathy  PT consulted to assess pt's functional mobility and d/c needs  Order placed for PT eval and tx, w/ up w/ A order  Performed at least 2 patient identifiers during session: Name and wristband  Comorbidities affecting pt's physical performance at time of assessment include: aphasia, ambulatory dysfunction, new onset A-fib, accelerated hypertension, hyperglycemia, asymptomatic bacteriuria, mixed hyperlipidemia, stenosis of left internal carotid artery  PTA, pt was independent w/ ambulation w/ no AD, requires A for ADLs and IADLs, ambulates household distances, lives w/ family in two level house and retired  Personal factors affecting pt at time of IE include: inaccessible home environment, lives in two story house, communication issues, inability to ambulate household distances, inability to navigate level surfaces w/o external assistance, decreased cognition, positive fall history, inability to perform IADLs, inability to perform ADLs and inability to live alone  Please find objective findings from PT assessment regarding body systems outlined above with impairments and limitations including weakness, decreased ROM, decreased endurance, impaired coordination, decreased activity tolerance, decreased functional mobility tolerance, fall risk and decreased cognition  The following objective measures performed on IE also reveal limitations: Barthel Index: 10/100 and Modified [place]: 5 (severe disability)   Pt's clinical presentation is currently unstable/unpredictable seen in pt's presentation of ongoing medical management/monitoring, evident in need for assist w/ all phases of mobility when usually mobilizing independently, and inability to follow commands  Pt to benefit from continued PT tx to address deficits as defined above and maximize level of functional independent mobility and consistency  From PT/mobility standpoint, recommendation at time of d/c would be STR pending progress in order to facilitate return to PLOF  Barriers to Discharge Inaccessible home environment;Decreased caregiver support   Goals   Patient Goals none reported: family goal: to continue progress with therapy   STG Expiration Date 09/06/19   Short Term Goal #1 In 7-10 days: Increase bilateral LE strength 1/2 grade to facilitate independent mobility, Perform all bed mobility tasks with min A of 1 to decrease caregiver burden and PT to see and establish goals for functional transfers and ambulation when appropriate   Treatment Day 0   Plan   Treatment/Interventions LE strengthening/ROM; Therapeutic exercise;Patient/family training;Bed mobility;Continued evaluation;Spoke to nursing;OT;Family   PT Frequency 2-3x/wk   Recommendation   Recommendation Short-term skilled PT   PT - OK to Discharge Yes  (when medically cleared; if to STR)   Modified Coffee Scale   Modified Coffee Scale 5   Barthel Index   Feeding 5   Bathing 0   Grooming Score 0   Dressing Score 0   Bladder Score 0   Bowels Score 5   Toilet Use Score 0   Transfers (Bed/Chair) Score 0   Mobility (Level Surface) Score 0   Stairs Score 0   Barthel Index Score 10       Tatiana Santos, PT, DPT

## 2019-08-27 NOTE — PROGRESS NOTES
Neurology - Progress Note  Stephanie Gabriel 80 y o  female MRN: [id number]  Unit/Bed#: -01 Encounter: [id number]    Assessment:  Acute encephalopathy, likely in context of acute left mca strokes strongly suspected given the aphasia and rt sided weakness, and UTI  Exam is similar to yesterday  CT head yesterday showing a chronic left frontal infarct, perhaps in context of the left ica stenosis noted on cta yesterday  Newly detected afib, and given that although noted prior stroke on ct head, she has never had visible stroke like symptoms before thus unlikely we are purely dealing with recrudescence  Etiology would be afib vs left ica, mri will help clarify  She is tracking to verbal, smiling but not following any commands or verbalizing  Per daughter in law present in room pt has likely dementia at baseline  Speaks [place] with son, doesn't speak [place]  She is fully dependent on daughter in law for ADLs  She is confused frequently at baseline, unlikely she would know where she is or   At this point too early to prognosticate  Plan:  Swallowing eval  Mri brain w/o contrast  Cardiology and vascular surgery evals        ROS:  Cannot obtain given mentation    Vitals: Blood pressure 125/79, pulse 67, temperature (!) 97 2 °F (36 2 °C), resp  rate 18, height 5' (1 524 m), weight 54 2 kg (119 lb 7 8 oz), SpO2 95 %  ,Body mass index is 23 34 kg/m²  Physical Exam:    General appearance: alert, appears stated age and cooperative  Head: Normocephalic, without obvious abnormality, atraumatic      Neurologic: Mental status: Alert, tracking to verbal/visual  Spontaneously lifting her lue similar to yesterday and looking at it  Smiling at times  Not following any commands, not verbalizing in any manner  Cn 2-12: pupils appear intact, no apparent ophthalmoplegia  No facial asymmetry  Blink to threat intact  Motor: no involuntary movements  lue antigravity spontaneously   Flexor withdrawal all extremities, decreased on the right side  Lab, Imaging and other studies: I have personally reviewed pertinent reports          Counseling / Coordination of Care  Total 22 min spent evaluating patient and coming up with assessment/plan

## 2019-08-28 LAB
GLUCOSE SERPL-MCNC: 143 MG/DL (ref 65–140)
GLUCOSE SERPL-MCNC: 163 MG/DL (ref 65–140)
GLUCOSE SERPL-MCNC: 220 MG/DL (ref 65–140)
GLUCOSE SERPL-MCNC: 260 MG/DL (ref 65–140)

## 2019-08-28 PROCEDURE — 99232 SBSQ HOSP IP/OBS MODERATE 35: CPT | Performed by: NURSE PRACTITIONER

## 2019-08-28 PROCEDURE — 99232 SBSQ HOSP IP/OBS MODERATE 35: CPT | Performed by: PHYSICIAN ASSISTANT

## 2019-08-28 PROCEDURE — 92526 ORAL FUNCTION THERAPY: CPT

## 2019-08-28 PROCEDURE — 82948 REAGENT STRIP/BLOOD GLUCOSE: CPT

## 2019-08-28 PROCEDURE — 99233 SBSQ HOSP IP/OBS HIGH 50: CPT | Performed by: PSYCHIATRY & NEUROLOGY

## 2019-08-28 RX ORDER — POLYVINYL ALCOHOL 14 MG/ML
1 SOLUTION/ DROPS OPHTHALMIC
Status: DISCONTINUED | OUTPATIENT
Start: 2019-08-28 | End: 2019-08-28 | Stop reason: CLARIF

## 2019-08-28 RX ORDER — CEPHALEXIN 250 MG/1
250 CAPSULE ORAL EVERY 12 HOURS SCHEDULED
Status: DISCONTINUED | OUTPATIENT
Start: 2019-08-28 | End: 2019-08-29

## 2019-08-28 RX ADMIN — INSULIN LISPRO 1 UNITS: 100 INJECTION, SOLUTION INTRAVENOUS; SUBCUTANEOUS at 11:21

## 2019-08-28 RX ADMIN — CEPHALEXIN 250 MG: 250 CAPSULE ORAL at 11:21

## 2019-08-28 RX ADMIN — HEPARIN SODIUM 5000 UNITS: 5000 INJECTION INTRAVENOUS; SUBCUTANEOUS at 21:08

## 2019-08-28 RX ADMIN — DEXTRAN 70 AND HYPROMELLOSE 2910 1 DROP: 1; 3 SOLUTION/ DROPS OPHTHALMIC at 16:22

## 2019-08-28 RX ADMIN — INSULIN LISPRO 2 UNITS: 100 INJECTION, SOLUTION INTRAVENOUS; SUBCUTANEOUS at 21:27

## 2019-08-28 RX ADMIN — ASPIRIN 81 MG 81 MG: 81 TABLET ORAL at 09:05

## 2019-08-28 RX ADMIN — HEPARIN SODIUM 5000 UNITS: 5000 INJECTION INTRAVENOUS; SUBCUTANEOUS at 09:05

## 2019-08-28 RX ADMIN — INSULIN LISPRO 1 UNITS: 100 INJECTION, SOLUTION INTRAVENOUS; SUBCUTANEOUS at 16:22

## 2019-08-28 RX ADMIN — ATORVASTATIN CALCIUM 40 MG: 40 TABLET, FILM COATED ORAL at 16:22

## 2019-08-28 RX ADMIN — CEPHALEXIN 250 MG: 250 CAPSULE ORAL at 21:08

## 2019-08-28 NOTE — PROGRESS NOTES
Cardiology Progress Note - Murali Beck 80 y o  female MRN: [id number]    Unit/Bed#: -01 Encounter: [id number]      Assessment/Plan:  1- Acute encephalopathy with right sided weakness and aphasia  CT head and neck shows chronic microangiopathic with old small left frontal cortex cortical infarct  MRI brain shows recent infarcts are seen in left anterior cerebral artery, frontal parietal junction and corpus callosum with scattered chronic microangiopathic changes  Neurology following  2- New onset atrial fibrillation, rate controlled off AV terry blockers, HR 50-60s  Patient is not a candidate for anticoagulation given advanced age and fall risk  Continue ASA only  No further cardiac work up is anticipated  3- Bilateral carotid artery stenosis  Vascular surgery not pursuing aggressive surgical intervention at this time  Continue aspirin and statin  Consider Plavix  4- Hypertension  continue medications  5- Hyperlipidemia  Started on atorvastatin 40 mg daily  Subjective:   Patient seen and examined today  Unfortunately patients family not at bedside  Objective:     Vitals: Blood pressure 153/87, pulse 61, temperature 98 °F (36 7 °C), resp  rate 18, height 5' (1 524 m), weight 54 2 kg (119 lb 7 8 oz), SpO2 95 %  , Body mass index is 23 34 kg/m² ,   Orthostatic Blood Pressures      Most Recent Value   Blood Pressure  153/87 filed at 08/28/2019 1103   Patient Position - Orthostatic VS  Lying filed at 08/28/2019 0300            Intake/Output Summary (Last 24 hours) at 8/28/2019 1408  Last data filed at 8/28/2019 0901  Gross per 24 hour   Intake 1232 5 ml   Output 200 ml   Net 1032 5 ml         Physical Exam:    GEN: Murali Beck appears well, alert and oriented x 3, pleasant and cooperative   HEENT: pupils equal, round, and reactive to light; extraocular muscles intact  NECK: supple, no carotid bruits   HEART: +Irregular rhythm, normal S1 and S2, no murmurs, clicks, gallops or rubs   LUNGS: clear to auscultation bilaterally; no wheezes, rales, or rhonchi   ABDOMEN: normal bowel sounds, soft, no tenderness, no distention  EXTREMITIES: peripheral pulses normal; no clubbing, cyanosis, or edema      Medications:      Current Facility-Administered Medications:     aspirin chewable tablet 81 mg, 81 mg, Oral, Daily, Johanna Mic, CRNP, 81 mg at 08/28/19 0905    atorvastatin (LIPITOR) tablet 40 mg, 40 mg, Oral, Daily With Dinner, Johanna Mic, CRNP, 40 mg at 08/27/19 1717    cephalexin (KEFLEX) capsule 250 mg, 250 mg, Oral, Q12H [address], Johanna Mic, CRNP, 250 mg at 08/28/19 1121    dextran 70-hypromellose (GENTEAL TEARS) 0 1-0 3 % ophthalmic solution 1 drop, 1 drop, Both Eyes, Q3H PRN, Johanna Mic, CRNP    heparin (porcine) subcutaneous injection 5,000 Units, 5,000 Units, Subcutaneous, Q12H [address], 5,000 Units at 08/28/19 0905 **AND** [COMPLETED] Platelet count, , , Once, Aristides Thomas MD    hydrALAZINE (APRESOLINE) injection 10 mg, 10 mg, Intravenous, Q6H PRN, Aristides Thomas MD, 5 mg at 08/26/19 1524    hydrALAZINE (APRESOLINE) injection 5 mg, 5 mg, Intravenous, Once, Tomás Moses MD    insulin lispro (HumaLOG) 100 units/mL subcutaneous injection 1-5 Units, 1-5 Units, Subcutaneous, TID AC, 1 Units at 08/28/19 1121 **AND** Fingerstick Glucose (POCT), , , TID AC, Belen De La Cruz PA-C    insulin lispro (HumaLOG) 100 units/mL subcutaneous injection 1-5 Units, 1-5 Units, Subcutaneous, HS, Belen De La Cruz PA-C, 2 Units at 08/27/19 2345    ondansetron (ZOFRAN) injection 4 mg, 4 mg, Intravenous, Q4H PRN, Aristides Thomas MD     Labs & Results:    Results from last 7 days   Lab Units 08/26/19  1204   TROPONIN I ng/mL <0 02     Results from last 7 days   Lab Units 08/27/19  0441 08/26/19  1720 08/26/19  1204   WBC Thousand/uL 11 82*  --  12 87*   HEMOGLOBIN g/dL 12 0  --  12 9   HEMATOCRIT % 36 7  --  39 1   PLATELETS Thousands/uL 168 190 143*     Results from last 7 days   Lab Units 08/26/19  1204 TRIGLYCERIDES mg/dL 176*   HDL mg/dL 42     Results from last 7 days   Lab Units 08/27/19  0441 08/26/19  1204   POTASSIUM mmol/L 3 8 3 5   CHLORIDE mmol/L 103 100   CO2 mmol/L 27 29   BUN mg/dL 14 17   CREATININE mg/dL 0 91 1 19   CALCIUM mg/dL 8 7 9 3   ALK PHOS U/L 56 65   ALT U/L 11* 14   AST U/L 21 17     Results from last 7 days   Lab Units 08/26/19  1204   INR  1 02   PTT seconds 21*     Results from last 7 days   Lab Units 08/27/19  0441   MAGNESIUM mg/dL 2 0

## 2019-08-28 NOTE — ASSESSMENT & PLAN NOTE
· Patient presented with urinalysis showing moderate leukocytes, occasional bacteria, innumerable WBCs, negative nitrites    · Urine culture negative however given grossly positive microscopic and leukocytosis that is responding a biotics will treat patient for 5 days will switch to oral Keflex

## 2019-08-28 NOTE — ASSESSMENT & PLAN NOTE
· Patient presents with the acute onset of encephalopathy as noticed by her son and daughter-in-law for the past 2 days after a fall  · Differential diagnoses include:  Stroke vs UTI vs hypertensive encephalopathy vs seizure vs metabolic abnormalities e g  Hyperglycemia    · Patient id patient on stroke pathway   · MRI completed showing multiple recent left-sided infarct   · Neurology following recommendations appreciated  · Continue speech therapy recommendations for food which is pureed diet with honey thick liquids  · There is a component of in the way of a UTI although culture is negative will treat out for 5 days patient on day 3/5  · Await final recs from Cardiology/pulmonology a specially on prognosis as patient and family likely benefit from goals of care discussion once workup is complete consider palliative care consult

## 2019-08-28 NOTE — PROGRESS NOTES
Progress Note Live Rod 11/19/1926, 80 y o  female MRN: [id number]    Unit/Bed#: -01 Encounter: [id number]    Primary Care Provider: No primary care provider on file  Date and time admitted to hospital: 8/26/2019 10:35 AM        * Acute encephalopathy  Assessment & Plan  · Patient presents with the acute onset of encephalopathy as noticed by her son and daughter-in-law for the past 2 days after a fall  · Differential diagnoses include:  Stroke vs UTI vs hypertensive encephalopathy vs seizure vs metabolic abnormalities e g  Hyperglycemia  · Patient id patient on stroke pathway   · MRI completed showing multiple recent left-sided infarct   · Neurology following recommendations appreciated  · Continue speech therapy recommendations for food which is pureed diet with honey thick liquids  · There is a component of in the way of a UTI although culture is negative will treat out for 5 days patient on day 3/5  · Await final recs from Cardiology/pulmonology a specially on prognosis as patient and family likely benefit from goals of care discussion once workup is complete consider palliative care consult    Aphasia  Assessment & Plan  · Patient noticed to have aphasia prior to admission  · MRI suggestive of multiple recent left-sided infarcts  · Continue stroke pathway  · Echocardiogram LVEF 60% there is a moderate aortic stenosis  · Cardiology neurology recommendations appreciated    Accelerated hypertension  Assessment & Plan  · Patient presented with accelerated hypertension with SBP in the 220s  · Overall clinically improved  · Continue p r n  Hydralazine    New onset atrial fibrillation [facility])  Assessment & Plan  · Patient found to be in new onset AFib during evaluation in the ED  · Continue telemetry monitoring  · Continues to be rate controlled  · Cardiology consult pending  · Consider anticoagulation in setting of new onset AFib with stroke risk CHADS2-VASc2 score-5    · Cardiology recommendations appreciated  · Cardiology to consider TTE    Hyperglycemia  Assessment & Plan  · Patient has no known history of type 2 diabetes  · Patient with hyperglycemia on admission however A1c noted to be 7 1  · Recommend dietary changes repeat A1c in 3 months    Ambulatory dysfunction  Assessment & Plan  · Patient presented with ambulatory dysfunction secondary to ataxia from possible stroke  · Patient had a witnessed fall 2 days ago in her bathroom by her daughter-in-law  · Continue fall precautions  · PT/OT  Asymptomatic bacteriuria  Assessment & Plan  · Patient presented with urinalysis showing moderate leukocytes, occasional bacteria, innumerable WBCs, negative nitrites  · Urine culture negative however given grossly positive microscopic and leukocytosis that is responding a biotics will treat patient for 5 days will switch to oral Keflex      Mixed hyperlipidemia  Assessment & Plan  · Patient has elevated total cholesterol including elevated LDL and elevated triglycerides  · Continue statin    Stenosis of left internal carotid artery  Assessment & Plan  · Vascular surgery evaluated patient unfortunately given her age and frail state vascular does not feel patient is a surgical candidate for her greater than 70% stenosis of the left ICA  · Recommend optimization with statin aspirin therapy  · Agree may need to consider goals of care discussion  VTE Pharmacologic Prophylaxis:   Pharmacologic: Heparin  Mechanical VTE Prophylaxis in Place: Yes    Patient Centered Rounds: I have performed bedside rounds with nursing staff today  Discussions with Specialists or Other Care Team Provider:  Neurology note reviewed cardiology note reviewed    Education and Discussions with Family / Patient:  Son and daughter-in-law at bedside    Time Spent for Care: 30 minutes  More than 50% of total time spent on counseling and coordination of care as described above      Current Length of Stay: 2 day(s)    Current Patient Status: Inpatient   Certification Statement: The patient will continue to require additional inpatient hospital stay due to Ongoing acute workup in setting of CVA    Discharge Plan:  Not medically cleared anticipate discharge in next 24-48 hours    Code Status: Level 3 - DNAR and DNI      Subjective:   Patient remains aphasic and only glands as and smiles  At the bedside with son and daughter-in-law who stay they were concerned given name is the signs of a stroke  Education and support given to them recommendations  Overall the are not interested in short-term rehab do not like hell patient's or treated and no settings and regardless will be taking her home  They are not sure how aggressive they want to get with her care and are open to lying goals of care would be her best interests closer to discharge  They do opened palliative care versus other options if necessary  Objective:     Vitals:   Temp (24hrs), Av 1 °F (36 7 °C), Min:97 5 °F (36 4 °C), Max:98 4 °F (36 9 °C)    Temp:  [97 5 °F (36 4 °C)-98 4 °F (36 9 °C)] 98 °F (36 7 °C)  HR:  [61-95] 61  Resp:  [17-20] 18  BP: (100-168)/(63-90) 153/87  SpO2:  [94 %-96 %] 95 %  Body mass index is 23 34 kg/m²  Input and Output Summary (last 24 hours): Intake/Output Summary (Last 24 hours) at 2019 1308  Last data filed at 2019 0901  Gross per 24 hour   Intake 1232 5 ml   Output 200 ml   Net 1032 5 ml       Physical Exam:     Physical Exam   HENT:   Head: Normocephalic and atraumatic  Eyes: Conjunctivae and EOM are normal    Neck: Normal range of motion  Cardiovascular: Normal rate, regular rhythm and normal heart sounds  Pulmonary/Chest: Effort normal and breath sounds normal    Abdominal: Soft  Bowel sounds are normal    Neurological: She is alert  Right arm flaccid noted flexion of right great toe aphasic nonverbal   Skin: Skin is warm and dry           Additional Data:     Labs:    Results from last 7 days Lab Units 08/27/19  0441   WBC Thousand/uL 11 82*   HEMOGLOBIN g/dL 12 0   HEMATOCRIT % 36 7   PLATELETS Thousands/uL 168   NEUTROS PCT % 65   LYMPHS PCT % 22   MONOS PCT % 11   EOS PCT % 1     Results from last 7 days   Lab Units 08/27/19  0441   SODIUM mmol/L 137   POTASSIUM mmol/L 3 8   CHLORIDE mmol/L 103   CO2 mmol/L 27   BUN mg/dL 14   CREATININE mg/dL 0 91   ANION GAP mmol/L 7   CALCIUM mg/dL 8 7   ALBUMIN g/dL 2 9*   TOTAL BILIRUBIN mg/dL 0 50   ALK PHOS U/L 56   ALT U/L 11*   AST U/L 21   GLUCOSE RANDOM mg/dL 128     Results from last 7 days   Lab Units 08/26/19  1204   INR  1 02     Results from last 7 days   Lab Units 08/28/19  1100 08/28/19  0703 08/27/19  2044 08/27/19  1709 08/27/19  1157 08/27/19  0607 08/27/19  0018 08/26/19  2116 08/26/19  1727   POC GLUCOSE mg/dl 220* 143* 212* 141* 166* 134 125 142* 165*     Results from last 7 days   Lab Units 08/26/19  1204   HEMOGLOBIN A1C % 7 1*               * I Have Reviewed All Lab Data Listed Above  * Additional Pertinent Lab Tests Reviewed:  All Labs Within Last 24 Hours Reviewed    Imaging:    Imaging Reports Reviewed Today Include:  MRI as mentioned above      Recent Cultures (last 7 days):     Results from last 7 days   Lab Units 08/26/19  1244   URINE CULTURE  No Growth <1000 cfu/mL       Last 24 Hours Medication List:     Current Facility-Administered Medications:  aspirin 81 mg Oral Daily LENA Villafuerte   atorvastatin 40 mg Oral Daily With Dinner LENA Villafuerte   cephalexin 250 mg Oral Q12H [address] LENA Villafuerte   dextran 70-hypromellose 1 drop Both Eyes Q3H PRN LENA Villafuerte   heparin (porcine) 5,000 Units Subcutaneous Q12H [address] Taty Arroyo MD   hydrALAZINE 10 mg Intravenous Q6H PRN Taty Arroyo MD   hydrALAZINE 5 mg Intravenous Once Julius Arce MD   insulin lispro 1-5 Units Subcutaneous TID AC Belen De La Cruz PA-C   insulin lispro 1-5 Units Subcutaneous HS Belen De La Cruz PA-C   ondansetron 4 mg Intravenous Q4H ARUN Chakraborty MD        Today, Patient Was Seen By: LENA Bernabe    ** Please Note: Dictation voice to text software may have been used in the creation of this document   **

## 2019-08-28 NOTE — ASSESSMENT & PLAN NOTE
· Patient presented with ambulatory dysfunction secondary to ataxia from possible stroke  · Patient had a witnessed fall 2 days ago in her bathroom by her daughter-in-law  · Continue fall precautions  · PT/OT

## 2019-08-28 NOTE — ASSESSMENT & PLAN NOTE
· Vascular surgery evaluated patient unfortunately given her age and frail state vascular does not feel patient is a surgical candidate for her greater than 70% stenosis of the left ICA  · Recommend optimization with statin aspirin therapy  · Agree may need to consider goals of care discussion

## 2019-08-28 NOTE — ASSESSMENT & PLAN NOTE
· Patient presented with accelerated hypertension with SBP in the 220s  · Overall clinically improved  · Continue p r n   Hydralazine

## 2019-08-28 NOTE — ASSESSMENT & PLAN NOTE
· Patient noticed to have aphasia prior to admission  · MRI suggestive of multiple recent left-sided infarcts  · Continue stroke pathway  · Echocardiogram LVEF 60% there is a moderate aortic stenosis  · Cardiology neurology recommendations appreciated

## 2019-08-28 NOTE — PLAN OF CARE
Problem: Prexisting or High Potential for Compromised Skin Integrity  Goal: Skin integrity is maintained or improved  Description  INTERVENTIONS:  - Identify patients at risk for skin breakdown  - Assess and monitor skin integrity  - Assess and monitor nutrition and hydration status  - Monitor labs   - Assess for incontinence   - Turn and reposition patient  - Assist with mobility/ambulation  - Relieve pressure over bony prominences  - Avoid friction and shearing  - Provide appropriate hygiene as needed including keeping skin clean and dry  - Evaluate need for skin moisturizer/barrier cream  - Collaborate with interdisciplinary team   - Patient/family teaching  - Consider wound care consult   Outcome: Progressing     Problem: Neurological Deficit  Goal: Neurological status is stable or improving  Description  Interventions:  - Monitor and assess patient's level of consciousness, motor function, sensory function, and level of assistance needed for ADLs  - Monitor and report changes from baseline  Collaborate with interdisciplinary team to initiate plan and implement interventions as ordered  - Provide and maintain a safe environment  - Consider seizure precautions  - Consider fall precautions  - Consider aspiration precautions  - Consider bleeding precautions  Outcome: Progressing     Problem: Activity Intolerance/Impaired Mobility  Goal: Mobility/activity is maintained at optimum level for patient  Description  Interventions:  - Assess and monitor patient  barriers to mobility and need for assistive/adaptive devices  - Assess patient's emotional response to limitations  - Collaborate with interdisciplinary team and initiate plans and interventions as ordered  - Encourage independent activity per ability   - Maintain proper body alignment  - Perform active/passive rom as tolerated/ordered    - Plan activities to conserve energy   - Turn patient as appropriate  Outcome: Progressing     Problem: Communication Impairment  Goal: Ability to express needs and understand communication  Description  Assess patient's communication skills and ability to understand information  Patient will demonstrate use of effective communication techniques, alternative methods of communication and understanding even if not able to speak  - Encourage communication and provide alternate methods of communication as needed  - Collaborate with case management/ for discharge needs  - Include patient/family/caregiver in decisions related to communication  Outcome: Progressing     Problem: Potential for Aspiration  Goal: Non-ventilated patient's risk of aspiration is minimized  Description  Assess and monitor vital signs, respiratory status, and labs (WBC)  Monitor for signs of aspiration (tachypnea, cough, rales, wheezing, cyanosis, fever)  - Assess and monitor patient's ability to swallow  - Place patient up in chair to eat if possible  - HOB up at 90 degrees to eat if unable to get patient up into chair   - Supervise patient during oral intake  - Instruct patient/ family to take small bites  - Instruct patient/ family to take small single sips when taking liquids  - Follow patient-specific strategies generated by speech pathologist   Outcome: Progressing     Problem: Nutrition  Goal: Nutrition/Hydration status is improving  Description  Monitor and assess patient's nutrition/hydration status for malnutrition (ex- brittle hair, bruises, dry skin, pale skin and conjunctiva, muscle wasting, smooth red tongue, and disorientation)  Collaborate with interdisciplinary team and initiate plan and interventions as ordered  Monitor patient's weight and dietary intake as ordered or per policy  Utilize nutrition screening tool and intervene per policy  Determine patient's food preferences and provide high-protein, high-caloric foods as appropriate       - Assist patient with eating   - Allow adequate time for meals   - Encourage patient to take dietary supplement as ordered  - Collaborate with clinical nutritionist   - Include patient/family/caregiver in decisions related to nutrition    Outcome: Progressing     Problem: CARDIOVASCULAR - ADULT  Goal: Maintains optimal cardiac output and hemodynamic stability  Description  INTERVENTIONS:  - Monitor I/O, vital signs and rhythm  - Monitor for S/S and trends of decreased cardiac output  - Administer and titrate ordered vasoactive medications to optimize hemodynamic stability  - Assess quality of pulses, skin color and temperature  - Assess for signs of decreased coronary artery perfusion  - Instruct patient to report change in severity of symptoms  Outcome: Progressing  Goal: Absence of cardiac dysrhythmias or at baseline rhythm  Description  INTERVENTIONS:  - Continuous cardiac monitoring, vital signs, obtain 12 lead EKG if ordered  - Administer antiarrhythmic and heart rate control medications as ordered  - Monitor electrolytes and administer replacement therapy as ordered  Outcome: Progressing     Problem: METABOLIC, FLUID AND ELECTROLYTES - ADULT  Goal: Electrolytes maintained within normal limits  Description  INTERVENTIONS:  - Monitor labs and assess patient for signs and symptoms of electrolyte imbalances  - Administer electrolyte replacement as ordered  - Monitor response to electrolyte replacements, including repeat lab results as appropriate  - Instruct patient on fluid and nutrition as appropriate  Outcome: Progressing  Goal: Fluid balance maintained  Description  INTERVENTIONS:  - Monitor labs   - Monitor I/O and WT  - Instruct patient on fluid and nutrition as appropriate  - Assess for signs & symptoms of volume excess or deficit  Outcome: Progressing  Goal: Glucose maintained within target range  Description  INTERVENTIONS:  - Monitor Blood Glucose as ordered  - Assess for signs and symptoms of hyperglycemia and hypoglycemia  - Administer ordered medications to maintain glucose within target range  - Assess nutritional intake and initiate nutrition service referral as needed  Outcome: Progressing     Problem: MUSCULOSKELETAL - ADULT  Goal: Maintain or return mobility to safest level of function  Description  INTERVENTIONS:  - Assess patient's ability to carry out ADLs; assess patient's baseline for ADL function and identify physical deficits which impact ability to perform ADLs (bathing, care of mouth/teeth, toileting, grooming, dressing, etc )  - Assess/evaluate cause of self-care deficits   - Assess range of motion  - Assess patient's mobility  - Assess patient's need for assistive devices and provide as appropriate  - Encourage maximum independence but intervene and supervise when necessary  - Involve family in performance of ADLs  - Assess for home care needs following discharge   - Consider OT consult to assist with ADL evaluation and planning for discharge  - Provide patient education as appropriate  Outcome: Progressing  Goal: Maintain proper alignment of affected body part  Description  INTERVENTIONS:  - Support, maintain and protect limb and body alignment  - Provide patient/ family with appropriate education  Outcome: Progressing     Problem: Nutrition/Hydration-ADULT  Goal: Nutrient/Hydration intake appropriate for improving, restoring or maintaining nutritional needs  Description  Monitor and assess patient's nutrition/hydration status for malnutrition  Collaborate with interdisciplinary team and initiate plan and interventions as ordered  Monitor patient's weight and dietary intake as ordered or per policy  Utilize nutrition screening tool and intervene as necessary  Determine patient's food preferences and provide high-protein, high-caloric foods as appropriate       INTERVENTIONS:  - Monitor oral intake, urinary output, labs, and treatment plans  - Assess nutrition and hydration status and recommend course of action  - Evaluate amount of meals eaten  - Assist patient with eating if necessary   - Allow adequate time for meals  - Recommend/ encourage appropriate diets, oral nutritional supplements, and vitamin/mineral supplements  - Order, calculate, and assess calorie counts as needed  - Recommend, monitor, and adjust tube feedings and TPN/PPN based on assessed needs  - Assess need for intravenous fluids  - Provide specific nutrition/hydration education as appropriate  - Include patient/family/caregiver in decisions related to nutrition  Outcome: Progressing     Problem: DISCHARGE PLANNING  Goal: Discharge to home or other facility with appropriate resources  Description  INTERVENTIONS:  - Identify barriers to discharge w/patient and caregiver  - Arrange for needed discharge resources and transportation as appropriate  - Identify discharge learning needs (meds, wound care, etc )  - Arrange for interpretive services to assist at discharge as needed  - Patient son should be at bedside at discharge  - Refer to Case Management Department for coordinating discharge planning if the patient needs post-hospital services based on physician/advanced practitioner order or complex needs related to functional status, cognitive ability, or social support system   Outcome: Progressing     Problem: Potential for Falls  Goal: Patient will remain free of falls  Description  INTERVENTIONS:  - Assess patient frequently for physical needs  -  Identify cognitive and physical deficits and behaviors that affect risk of falls    -  [place] fall precautions as indicated by assessment   - Educate patient/family on patient safety including physical limitations  - Instruct patient to call for assistance with activity based on assessment  - Modify environment to reduce risk of injury  - Consider OT/PT consult to assist with strengthening/mobility  Outcome: Progressing

## 2019-08-28 NOTE — SPEECH THERAPY NOTE
SLP Swallow Progress Note    Patient Name: Donny Tavarez  WNCNS'L Date: 8/28/2019    Subjective:  -  Objective:  Pt was seen for dysphagia treatment bedside to ensure tolerance of current dieand to assess readiness for safe diet upgrade  Pt was alert and upright in bed  No family present  Pt was assessed with puree, honey thick (by tsp & cup),  nectar thick liquids (by cup & straw) and a few sips of thin water  Retrieval from tsp& cup was functional   "Blowing" into straw noted with water  Bolus manipulation and transfer were mildly prolonged and incoordinate appearing on occasion (coordination challenged by increased RR of 28 and bouts of increased work of breathing) Swallow initiation appeared prompt but risk for aspiration 2* challenge of incoordination  +mild cough on 1 of 3 sips of water  Assessment:  Pt c s/s challenge of coordinating respiration and swallow when minimally challenged with thinner liquid  Plan/Recommendations:  Continue current diet and precautions  Family education to be completed when available

## 2019-08-28 NOTE — ASSESSMENT & PLAN NOTE
· Patient found to be in new onset AFib during evaluation in the ED  · Continue telemetry monitoring  · Continues to be rate controlled  · Cardiology consult pending  · Consider anticoagulation in setting of new onset AFib with stroke risk CHADS2-VASc2 score-5    · Cardiology recommendations appreciated  · Cardiology to consider TTE

## 2019-08-28 NOTE — PROGRESS NOTES
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, etiology likely due to left carotid disease vs lower suspicion for newly discovered AFib, given only left hemispheric involvement  Worsening in exam likely related to post stroke swelling  Plan:  -Continue ASA 81 mg daily  -Continue atorvastatin 40 mg daily  -Telemetry  -Frequent neuro checks  -PT/OT/speech  -Medical management per primary team  -Continue supportive care per primary team, notify with changes  -The patient should follow-up with outpatient neurology   Communication has been sent to the office to schedule a follow-up appointment   -Additional recommendations as per Attending Neurologist     Imaging/Labs:   -CT head unremarkable for acute intracranial process; no skull fracture noted; chronic microangiopathy and 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 noted  -CTA head and neck revealed small chronic cortical infarction left frontal vertex, consistent with CT; greater than 70% luminal constriction at the origin of the left ICA; approximately 40% stenosis at the origin of the right ICA; no additional evidence of critical stenosis, dissection, or occlusion involving remaining cervical carotid or vertebral segments or visualized cerebral arteries  -CT C-spine unremarkable for acute fracture or traumatic malalignment  -MRI brain revealed recent infarcts in the left anterior cerebral artery distribution no paramedian frontal lobe, frontal parietal junction, and genu of corpus callosum; scattered chronic microangiopathic changes noted  -Echo revealed EF 60%, no regional wall motion abnormalities, concentric hypertrophy present, mild MR, moderate aortic valve calcification, moderate aortic stenosis, mild TR, normal sized atrium  -Lipid panel:  Cholesterol elevated 206, triglycerides elevated 176, HDL 42, LDL elevated 129  -Hemoglobin A1c:  7 1    Subjective:   Patient was evaluated by vascular surgery yesterday and noted that given her age, frailty, and significant neurologic deficit, patient is a poor candidate for surgical intervention  Vascular surgery recommend continuing management with ASA and statin, consider adding Plavix  Vital signs now stable  Patient continues to be nonverbal, which family states is unusual for her  Patient now has worsening RUE weakness as well as RLE weakness  ROS:  12 point ROS limited to patient being nonverbal    Vitals: Blood pressure 152/86, pulse 66, temperature 97 5 °F (36 4 °C), resp  rate 18, height 5' (1 524 m), weight 54 2 kg (119 lb 7 8 oz), SpO2 95 %  ,Body mass index is 23 34 kg/m²  Physical Exam   Constitutional: She appears well-developed and well-nourished  No distress  HENT:   Head: Normocephalic and atraumatic  Eyes: Pupils are equal, round, and reactive to light  Conjunctivae and EOM are normal  Right eye exhibits no discharge  Left eye exhibits no discharge  Neck: Normal range of motion  Neck supple  Cardiovascular: Normal rate and regular rhythm  Pulmonary/Chest: Effort normal  No respiratory distress  Abdominal: Soft  She exhibits no distension  There is no tenderness  There is no guarding  Skin: Skin is warm and dry  No rash noted  She is not diaphoretic  No erythema  Psychiatric: She has a normal mood and affect  Her behavior is normal    Patient is nonverbal, but appears pleasant   Nursing note and vitals reviewed  Neurologic Exam     Mental Status   Patient is asleep upon entering the room, accompanied by son and daughter-in-law  Patient required very noxious stimuli to awaken  Upon awakening, patient was very pleasant, however remained nonverbal   Patient was tracking examiner and looking around the room at her family  Patient was not able to follow any commands       Cranial Nerves     CN III, IV, VI   Pupils are equal, round, and reactive to light  Extraocular motions are normal    Conjugate gaze: present  Blink to threat bilaterally  No gross facial asymmetry noted  No apparent nystagmus noted, however EOMs limited due to patient not following commands  Able to cross midline in both directions     Motor Exam   No involuntary movements noted  Spontaneous movements noted in left upper extremity  When left upper extremity is manually lifted off the bed, drifts down to the bed  Right upper extremity flaccid, falls down to the bed immediately  Withdrawals the left lower extremity with noxious stimuli in the right  Triple flexion noted in right lower extremity     Sensory Exam   Sensory exam limited to patient being nonverbal     Gait, Coordination, and Reflexes     Tremor   Resting tremor: absent    Reflexes   Right plantar: normal  Left plantar: normal  Right ankle clonus: absent  Left ankle clonus: absent  Unable to assess coordination due to patient not following commands     Lab Results: I have personally reviewed pertinent reports  Recent Results (from the past 24 hour(s))   Fingerstick Glucose (POCT)    Collection Time: 08/27/19 11:57 AM   Result Value Ref Range    POC Glucose 166 (H) 65 - 140 mg/dl   Fingerstick Glucose (POCT)    Collection Time: 08/27/19  5:09 PM   Result Value Ref Range    POC Glucose 141 (H) 65 - 140 mg/dl   Fingerstick Glucose (POCT)    Collection Time: 08/27/19  8:44 PM   Result Value Ref Range    POC Glucose 212 (H) 65 - 140 mg/dl   Fingerstick Glucose (POCT)    Collection Time: 08/28/19  7:03 AM   Result Value Ref Range    POC Glucose 143 (H) 65 - 140 mg/dl   ]  Imaging Studies: I have personally reviewed pertinent reports and I have personally reviewed pertinent films in PACS  EKG, Pathology, and Other Studies: I have personally reviewed pertinent reports  VTE Prophylaxis: Heparin    Counseling / Coordination of Care  Total time spent today 35 minutes    Greater than 50% of total time was spent with the patient and/or family counseling and/or coordination of care  A description of the counseling/coordination of care:  Patient was seen and evaluated  Discussed with attending  Chart reviewed thoroughly including laboratory and imaging studies    Plan of care discussed with patient and primary team

## 2019-08-28 NOTE — PLAN OF CARE
Problem: Prexisting or High Potential for Compromised Skin Integrity  Goal: Skin integrity is maintained or improved  Description  INTERVENTIONS:  - Identify patients at risk for skin breakdown  - Assess and monitor skin integrity  - Assess and monitor nutrition and hydration status  - Monitor labs   - Assess for incontinence   - Turn and reposition patient  - Assist with mobility/ambulation  - Relieve pressure over bony prominences  - Avoid friction and shearing  - Provide appropriate hygiene as needed including keeping skin clean and dry  - Evaluate need for skin moisturizer/barrier cream  - Collaborate with interdisciplinary team   - Patient/family teaching  - Consider wound care consult   Outcome: Progressing     Problem: Neurological Deficit  Goal: Neurological status is stable or improving  Description  Interventions:  - Monitor and assess patient's level of consciousness, motor function, sensory function, and level of assistance needed for ADLs  - Monitor and report changes from baseline  Collaborate with interdisciplinary team to initiate plan and implement interventions as ordered  - Provide and maintain a safe environment  - Consider seizure precautions  - Consider fall precautions  - Consider aspiration precautions  - Consider bleeding precautions  Outcome: Progressing     Problem: Activity Intolerance/Impaired Mobility  Goal: Mobility/activity is maintained at optimum level for patient  Description  Interventions:  - Assess and monitor patient  barriers to mobility and need for assistive/adaptive devices  - Assess patient's emotional response to limitations  - Collaborate with interdisciplinary team and initiate plans and interventions as ordered  - Encourage independent activity per ability   - Maintain proper body alignment  - Perform active/passive rom as tolerated/ordered    - Plan activities to conserve energy   - Turn patient as appropriate  Outcome: Progressing     Problem: Communication Impairment  Goal: Ability to express needs and understand communication  Description  Assess patient's communication skills and ability to understand information  Patient will demonstrate use of effective communication techniques, alternative methods of communication and understanding even if not able to speak  - Encourage communication and provide alternate methods of communication as needed  - Collaborate with case management/ for discharge needs  - Include patient/family/caregiver in decisions related to communication  Outcome: Progressing     Problem: Potential for Aspiration  Goal: Non-ventilated patient's risk of aspiration is minimized  Description  Assess and monitor vital signs, respiratory status, and labs (WBC)  Monitor for signs of aspiration (tachypnea, cough, rales, wheezing, cyanosis, fever)  - Assess and monitor patient's ability to swallow  - Place patient up in chair to eat if possible  - HOB up at 90 degrees to eat if unable to get patient up into chair   - Supervise patient during oral intake  - Instruct patient/ family to take small bites  - Instruct patient/ family to take small single sips when taking liquids  - Follow patient-specific strategies generated by speech pathologist   Outcome: Progressing     Problem: Nutrition  Goal: Nutrition/Hydration status is improving  Description  Monitor and assess patient's nutrition/hydration status for malnutrition (ex- brittle hair, bruises, dry skin, pale skin and conjunctiva, muscle wasting, smooth red tongue, and disorientation)  Collaborate with interdisciplinary team and initiate plan and interventions as ordered  Monitor patient's weight and dietary intake as ordered or per policy  Utilize nutrition screening tool and intervene per policy  Determine patient's food preferences and provide high-protein, high-caloric foods as appropriate       - Assist patient with eating   - Allow adequate time for meals   - Encourage patient to take dietary supplement as ordered  - Collaborate with clinical nutritionist   - Include patient/family/caregiver in decisions related to nutrition    Outcome: Progressing     Problem: CARDIOVASCULAR - ADULT  Goal: Maintains optimal cardiac output and hemodynamic stability  Description  INTERVENTIONS:  - Monitor I/O, vital signs and rhythm  - Monitor for S/S and trends of decreased cardiac output  - Administer and titrate ordered vasoactive medications to optimize hemodynamic stability  - Assess quality of pulses, skin color and temperature  - Assess for signs of decreased coronary artery perfusion  - Instruct patient to report change in severity of symptoms  Outcome: Progressing  Goal: Absence of cardiac dysrhythmias or at baseline rhythm  Description  INTERVENTIONS:  - Continuous cardiac monitoring, vital signs, obtain 12 lead EKG if ordered  - Administer antiarrhythmic and heart rate control medications as ordered  - Monitor electrolytes and administer replacement therapy as ordered  Outcome: Progressing     Problem: METABOLIC, FLUID AND ELECTROLYTES - ADULT  Goal: Electrolytes maintained within normal limits  Description  INTERVENTIONS:  - Monitor labs and assess patient for signs and symptoms of electrolyte imbalances  - Administer electrolyte replacement as ordered  - Monitor response to electrolyte replacements, including repeat lab results as appropriate  - Instruct patient on fluid and nutrition as appropriate  Outcome: Progressing  Goal: Fluid balance maintained  Description  INTERVENTIONS:  - Monitor labs   - Monitor I/O and WT  - Instruct patient on fluid and nutrition as appropriate  - Assess for signs & symptoms of volume excess or deficit  Outcome: Progressing  Goal: Glucose maintained within target range  Description  INTERVENTIONS:  - Monitor Blood Glucose as ordered  - Assess for signs and symptoms of hyperglycemia and hypoglycemia  - Administer ordered medications to maintain glucose within target range  - Assess nutritional intake and initiate nutrition service referral as needed  Outcome: Progressing     Problem: MUSCULOSKELETAL - ADULT  Goal: Maintain or return mobility to safest level of function  Description  INTERVENTIONS:  - Assess patient's ability to carry out ADLs; assess patient's baseline for ADL function and identify physical deficits which impact ability to perform ADLs (bathing, care of mouth/teeth, toileting, grooming, dressing, etc )  - Assess/evaluate cause of self-care deficits   - Assess range of motion  - Assess patient's mobility  - Assess patient's need for assistive devices and provide as appropriate  - Encourage maximum independence but intervene and supervise when necessary  - Involve family in performance of ADLs  - Assess for home care needs following discharge   - Consider OT consult to assist with ADL evaluation and planning for discharge  - Provide patient education as appropriate  Outcome: Progressing  Goal: Maintain proper alignment of affected body part  Description  INTERVENTIONS:  - Support, maintain and protect limb and body alignment  - Provide patient/ family with appropriate education  Outcome: Progressing     Problem: Nutrition/Hydration-ADULT  Goal: Nutrient/Hydration intake appropriate for improving, restoring or maintaining nutritional needs  Description  Monitor and assess patient's nutrition/hydration status for malnutrition  Collaborate with interdisciplinary team and initiate plan and interventions as ordered  Monitor patient's weight and dietary intake as ordered or per policy  Utilize nutrition screening tool and intervene as necessary  Determine patient's food preferences and provide high-protein, high-caloric foods as appropriate       INTERVENTIONS:  - Monitor oral intake, urinary output, labs, and treatment plans  - Assess nutrition and hydration status and recommend course of action  - Evaluate amount of meals eaten  - Assist patient with eating if necessary   - Allow adequate time for meals  - Recommend/ encourage appropriate diets, oral nutritional supplements, and vitamin/mineral supplements  - Order, calculate, and assess calorie counts as needed  - Recommend, monitor, and adjust tube feedings and TPN/PPN based on assessed needs  - Assess need for intravenous fluids  - Provide specific nutrition/hydration education as appropriate  - Include patient/family/caregiver in decisions related to nutrition  Outcome: Progressing     Problem: DISCHARGE PLANNING  Goal: Discharge to home or other facility with appropriate resources  Description  INTERVENTIONS:  - Identify barriers to discharge w/patient and caregiver  - Arrange for needed discharge resources and transportation as appropriate  - Identify discharge learning needs (meds, wound care, etc )  - Arrange for interpretive services to assist at discharge as needed  - Patient son should be at bedside at discharge  - Refer to Case Management Department for coordinating discharge planning if the patient needs post-hospital services based on physician/advanced practitioner order or complex needs related to functional status, cognitive ability, or social support system   Outcome: Progressing

## 2019-08-28 NOTE — ASSESSMENT & PLAN NOTE
· Patient has elevated total cholesterol including elevated LDL and elevated triglycerides    · Continue statin

## 2019-08-28 NOTE — ASSESSMENT & PLAN NOTE
· Patient has no known history of type 2 diabetes    · Patient with hyperglycemia on admission however A1c noted to be 7 1  · Recommend dietary changes repeat A1c in 3 months

## 2019-08-29 VITALS
TEMPERATURE: 99.1 F | SYSTOLIC BLOOD PRESSURE: 155 MMHG | OXYGEN SATURATION: 97 % | BODY MASS INDEX: 23.46 KG/M2 | HEIGHT: 60 IN | DIASTOLIC BLOOD PRESSURE: 79 MMHG | RESPIRATION RATE: 30 BRPM | WEIGHT: 119.49 LBS | HEART RATE: 71 BPM

## 2019-08-29 LAB
GLUCOSE SERPL-MCNC: 171 MG/DL (ref 65–140)
GLUCOSE SERPL-MCNC: 234 MG/DL (ref 65–140)

## 2019-08-29 PROCEDURE — 92526 ORAL FUNCTION THERAPY: CPT

## 2019-08-29 PROCEDURE — 99233 SBSQ HOSP IP/OBS HIGH 50: CPT | Performed by: PHYSICIAN ASSISTANT

## 2019-08-29 PROCEDURE — 82948 REAGENT STRIP/BLOOD GLUCOSE: CPT

## 2019-08-29 PROCEDURE — 99356 PR PROLONGED SVC I/P OR OBS SETTING 1ST HOUR: CPT | Performed by: PSYCHIATRY & NEUROLOGY

## 2019-08-29 PROCEDURE — 99233 SBSQ HOSP IP/OBS HIGH 50: CPT | Performed by: PSYCHIATRY & NEUROLOGY

## 2019-08-29 RX ORDER — BISACODYL 10 MG
10 SUPPOSITORY, RECTAL RECTAL DAILY PRN
Status: DISCONTINUED | OUTPATIENT
Start: 2019-08-29 | End: 2019-08-30 | Stop reason: HOSPADM

## 2019-08-29 RX ORDER — GLYCOPYRROLATE 0.2 MG/ML
0.2 INJECTION INTRAMUSCULAR; INTRAVENOUS
Status: DISCONTINUED | OUTPATIENT
Start: 2019-08-29 | End: 2019-08-30 | Stop reason: HOSPADM

## 2019-08-29 RX ORDER — LORAZEPAM 2 MG/ML
0.5 INJECTION INTRAMUSCULAR
Status: DISCONTINUED | OUTPATIENT
Start: 2019-08-29 | End: 2019-08-30 | Stop reason: HOSPADM

## 2019-08-29 RX ORDER — HALOPERIDOL 5 MG/ML
0.5 INJECTION INTRAMUSCULAR
Status: DISCONTINUED | OUTPATIENT
Start: 2019-08-29 | End: 2019-08-30 | Stop reason: HOSPADM

## 2019-08-29 RX ADMIN — CEPHALEXIN 250 MG: 250 CAPSULE ORAL at 08:28

## 2019-08-29 RX ADMIN — INSULIN LISPRO 2 UNITS: 100 INJECTION, SOLUTION INTRAVENOUS; SUBCUTANEOUS at 11:25

## 2019-08-29 RX ADMIN — DEXTRAN 70 AND HYPROMELLOSE 2910 1 DROP: 1; 3 SOLUTION/ DROPS OPHTHALMIC at 08:29

## 2019-08-29 RX ADMIN — ASPIRIN 81 MG 81 MG: 81 TABLET ORAL at 08:29

## 2019-08-29 RX ADMIN — INSULIN LISPRO 1 UNITS: 100 INJECTION, SOLUTION INTRAVENOUS; SUBCUTANEOUS at 08:28

## 2019-08-29 RX ADMIN — HEPARIN SODIUM 5000 UNITS: 5000 INJECTION INTRAVENOUS; SUBCUTANEOUS at 08:28

## 2019-08-29 NOTE — PROGRESS NOTES
Progress Note - Neurology   [facility] 80 y o  female MRN: [id number]  Unit/Bed#: -01 Encounter: [id number]    Assessment:  Multifocal embolic left TWIN distribution infarcts and inferior division left MCA infarcts on MRI brain  Etiology likely due to left carotid disease vs lower suspicion for newly discovered AFib, given only left hemispheric involvement  Plan:  -Continue ASA 81 mg daily  -Continue atorvastatin 40 mg daily  -Telemetry  -Frequent neuro checks  -PT/OT/speech  -Medical management per primary team  -Continue supportive care per primary team, notify with changes  -The patient should follow-up with outpatient neurology   Communication has been sent to the office to schedule a follow-up appointment   -Additional recommendations as per Attending Neurologist after family meeting    Imaging/Labs:   -CT head unremarkable for acute intracranial process; no skull fracture noted; chronic microangiopathy and 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 noted  -CTA head and neck revealed small chronic cortical infarction left frontal vertex, consistent with CT; greater than 70% luminal constriction at the origin of the left ICA; approximately 40% stenosis at the origin of the right ICA; no additional evidence of critical stenosis, dissection, or occlusion involving remaining cervical carotid or vertebral segments or visualized cerebral arteries  -CT C-spine unremarkable for acute fracture or traumatic malalignment  -MRI brain revealed recent infarcts in the left anterior cerebral artery distribution no paramedian frontal lobe, frontal parietal junction, and genu of corpus callosum; scattered chronic microangiopathic changes noted  -Echo revealed EF 60%, no regional wall motion abnormalities, concentric hypertrophy present, mild MR, moderate aortic valve calcification, moderate aortic stenosis, mild TR, normal sized atrium  -Lipid panel:  Cholesterol elevated 206, triglycerides elevated 176, HDL 42, LDL elevated 129  -Hemoglobin A1c:  7 1    Subjective:   BP elevated last night 178/90, appears to have improved today  Vital signs otherwise stable  Today patient remains nonverbal, not following any commands  Family meeting plan for 1:00 PM today  ROS:  12 point ROS limited to patient being nonverbal    Vitals: Blood pressure 151/82, pulse 70, temperature 99 °F (37 2 °C), resp  rate 20, height 5' (1 524 m), weight 54 2 kg (119 lb 7 8 oz), SpO2 95 %  ,Body mass index is 23 34 kg/m²  Physical Exam   Constitutional: She appears well-developed and well-nourished  No distress  HENT:   Head: Normocephalic and atraumatic  Eyes: Pupils are equal, round, and reactive to light  Conjunctivae and EOM are normal  Right eye exhibits no discharge  Left eye exhibits no discharge  Neck: Normal range of motion  Neck supple  Cardiovascular:   AFib on telemetry   Pulmonary/Chest: Effort normal  No respiratory distress  Skin: Skin is warm and dry  No rash noted  She is not diaphoretic  No erythema  No pallor  Nursing note and vitals reviewed  Neurologic Exam     Mental Status   Patient is awake upon entering the room, accompanied by son and daughter-in-law  Patient remains nonverbal, making it difficult to assess dysarthria  Patient is not following any commands  She does however  throughout the room and repetitively looks towards son  Cranial Nerves     CN III, IV, VI   Pupils are equal, round, and reactive to light  Extraocular motions are normal    Conjugate gaze: present  Blinks to threat bilaterally  Very slight right lower facial weakness noted  Able to cross midline in both directions, unable to assess nystagmus    Unable to assess tongue deviation due to patient not follow commands  Sensory exam limited due to global aphasia     Motor Exam   Upon manually raising bilateral upper extremities, both drop to the bed at the same rate  When placing examiner's finger in patient's left hand, patient is able to  and voluntarily elevate left upper extremity off the bed  Withdrawal to noxious stimuli in right upper extremity  Withdrawals to noxious stimuli in bilateral lower extremities, left more so than right     Sensory Exam   Sensory exam limited to patient being globally aphasic     Gait, Coordination, and Reflexes     Tremor   Resting tremor: absent    Reflexes   Right ankle clonus: absent  Left ankle clonus: absent  Unable to assess coordination secondary to patient being globally aphasic, not following commands     Lab Results: I have personally reviewed pertinent reports  Recent Results (from the past 24 hour(s))   Fingerstick Glucose (POCT)    Collection Time: 08/28/19 11:00 AM   Result Value Ref Range    POC Glucose 220 (H) 65 - 140 mg/dl   Fingerstick Glucose (POCT)    Collection Time: 08/28/19  4:19 PM   Result Value Ref Range    POC Glucose 163 (H) 65 - 140 mg/dl   Fingerstick Glucose (POCT)    Collection Time: 08/28/19  9:20 PM   Result Value Ref Range    POC Glucose 260 (H) 65 - 140 mg/dl   Fingerstick Glucose (POCT)    Collection Time: 08/29/19  6:11 AM   Result Value Ref Range    POC Glucose 171 (H) 65 - 140 mg/dl   ]  Imaging Studies: I have personally reviewed pertinent reports and I have personally reviewed pertinent films in PACS  EKG, Pathology, and Other Studies: I have personally reviewed pertinent reports  VTE Prophylaxis: Heparin    Counseling / Coordination of Care  Total time spent today 25 minutes  Greater than 50% of total time was spent with the patient and/or family counseling and/or coordination of care  A description of the counseling/coordination of care:  Patient was seen and evaluated  Discussed with attending  Chart reviewed thoroughly including laboratory and imaging studies    Plan of care discussed with patient and primary team

## 2019-08-29 NOTE — ASSESSMENT & PLAN NOTE
· Patient has no known history of type 2 diabetes    · Patient with hyperglycemia on admission however A1c noted to be 7 1  · Comfort care, no diabetic diet warranted

## 2019-08-29 NOTE — SPEECH THERAPY NOTE
Speech Language/Pathology    Speech/Language Pathology Progress Note    Patient Name: Stephanie Ramires  PWLCJ'S Date: 8/29/2019      Subjective:  Pt with eyes open and good eye contact  Brief smile upon my arrival   Seemed pleasant/content throughout session  No verbalizations elicited  Objective:  Pt seen for f/u dysphagia tx at breakfast   Current diet: puree with honey thick liquids  Nectar thick liquids provided this meal     Pt positioned fully upright  Required extra pillow behind head to eliminate neck extension  Pt did not follow any simple commands with or without a model  Non-verbal     She was fed breakfast of pureed eggs, bananas, yogurt and given nectar thick liquids x 4 oz by cup, straw, and spoon  Initially, pt demonstrated mild oral hold and mild swallow delay  As trials progressed, increased oral manipulation and hold with greater delay in swallow noted  Pt took nectar thick liquids by cup sips and straw initially  After 3 straw sips, she was unable to draw from straw anymore and liquids were provided by spoon  Intermittent secondary swallows noted for all consistencies  While pt is non-verbal, voice was observed x 2 during meal during a hiccup and later a sigh  Vocal quality remained clear  After approx  50% meal completion, pt noted to become somewhat out of breath and more frequent rest breaks were needed and provided between bites  No overt s/s aspiration observed  Assessment:  Tolerated a puree diet with nectar thick liquids given rest breaks as meal progressed  Plan/Recommendations:  Upgrade diet to dysphagia level 1 with nectar thick liquids  Liquids by small cup or straw sips OR by tsp  ST to f/u

## 2019-08-29 NOTE — PLAN OF CARE
Problem: Prexisting or High Potential for Compromised Skin Integrity  Goal: Skin integrity is maintained or improved  Description  INTERVENTIONS:  - Identify patients at risk for skin breakdown  - Assess and monitor skin integrity  - Assess and monitor nutrition and hydration status  - Monitor labs   - Assess for incontinence   - Turn and reposition patient  - Assist with mobility/ambulation  - Relieve pressure over bony prominences  - Avoid friction and shearing  - Provide appropriate hygiene as needed including keeping skin clean and dry  - Evaluate need for skin moisturizer/barrier cream  - Collaborate with interdisciplinary team   - Patient/family teaching  - Consider wound care consult   Outcome: Progressing     Problem: Neurological Deficit  Goal: Neurological status is stable or improving  Description  Interventions:  - Monitor and assess patient's level of consciousness, motor function, sensory function, and level of assistance needed for ADLs  - Monitor and report changes from baseline  Collaborate with interdisciplinary team to initiate plan and implement interventions as ordered  - Provide and maintain a safe environment  - Consider seizure precautions  - Consider fall precautions  - Consider aspiration precautions  - Consider bleeding precautions  Outcome: Progressing     Problem: Activity Intolerance/Impaired Mobility  Goal: Mobility/activity is maintained at optimum level for patient  Description  Interventions:  - Assess and monitor patient  barriers to mobility and need for assistive/adaptive devices  - Assess patient's emotional response to limitations  - Collaborate with interdisciplinary team and initiate plans and interventions as ordered  - Encourage independent activity per ability   - Maintain proper body alignment  - Perform active/passive rom as tolerated/ordered    - Plan activities to conserve energy   - Turn patient as appropriate  Outcome: Progressing     Problem: Communication Impairment  Goal: Ability to express needs and understand communication  Description  Assess patient's communication skills and ability to understand information  Patient will demonstrate use of effective communication techniques, alternative methods of communication and understanding even if not able to speak  - Encourage communication and provide alternate methods of communication as needed  - Collaborate with case management/ for discharge needs  - Include patient/family/caregiver in decisions related to communication  Outcome: Progressing     Problem: Potential for Aspiration  Goal: Non-ventilated patient's risk of aspiration is minimized  Description  Assess and monitor vital signs, respiratory status, and labs (WBC)  Monitor for signs of aspiration (tachypnea, cough, rales, wheezing, cyanosis, fever)  - Assess and monitor patient's ability to swallow  - Place patient up in chair to eat if possible  - HOB up at 90 degrees to eat if unable to get patient up into chair   - Supervise patient during oral intake  - Instruct patient/ family to take small bites  - Instruct patient/ family to take small single sips when taking liquids  - Follow patient-specific strategies generated by speech pathologist   Outcome: Progressing     Problem: Nutrition  Goal: Nutrition/Hydration status is improving  Description  Monitor and assess patient's nutrition/hydration status for malnutrition (ex- brittle hair, bruises, dry skin, pale skin and conjunctiva, muscle wasting, smooth red tongue, and disorientation)  Collaborate with interdisciplinary team and initiate plan and interventions as ordered  Monitor patient's weight and dietary intake as ordered or per policy  Utilize nutrition screening tool and intervene per policy  Determine patient's food preferences and provide high-protein, high-caloric foods as appropriate       - Assist patient with eating   - Allow adequate time for meals   - Encourage patient to take dietary supplement as ordered  - Collaborate with clinical nutritionist   - Include patient/family/caregiver in decisions related to nutrition    Outcome: Progressing     Problem: CARDIOVASCULAR - ADULT  Goal: Maintains optimal cardiac output and hemodynamic stability  Description  INTERVENTIONS:  - Monitor I/O, vital signs and rhythm  - Monitor for S/S and trends of decreased cardiac output  - Administer and titrate ordered vasoactive medications to optimize hemodynamic stability  - Assess quality of pulses, skin color and temperature  - Assess for signs of decreased coronary artery perfusion  - Instruct patient to report change in severity of symptoms  Outcome: Progressing  Goal: Absence of cardiac dysrhythmias or at baseline rhythm  Description  INTERVENTIONS:  - Continuous cardiac monitoring, vital signs, obtain 12 lead EKG if ordered  - Administer antiarrhythmic and heart rate control medications as ordered  - Monitor electrolytes and administer replacement therapy as ordered  Outcome: Progressing     Problem: METABOLIC, FLUID AND ELECTROLYTES - ADULT  Goal: Electrolytes maintained within normal limits  Description  INTERVENTIONS:  - Monitor labs and assess patient for signs and symptoms of electrolyte imbalances  - Administer electrolyte replacement as ordered  - Monitor response to electrolyte replacements, including repeat lab results as appropriate  - Instruct patient on fluid and nutrition as appropriate  Outcome: Progressing  Goal: Fluid balance maintained  Description  INTERVENTIONS:  - Monitor labs   - Monitor I/O and WT  - Instruct patient on fluid and nutrition as appropriate  - Assess for signs & symptoms of volume excess or deficit  Outcome: Progressing  Goal: Glucose maintained within target range  Description  INTERVENTIONS:  - Monitor Blood Glucose as ordered  - Assess for signs and symptoms of hyperglycemia and hypoglycemia  - Administer ordered medications to maintain glucose within target range  - Assess nutritional intake and initiate nutrition service referral as needed  Outcome: Progressing     Problem: MUSCULOSKELETAL - ADULT  Goal: Maintain or return mobility to safest level of function  Description  INTERVENTIONS:  - Assess patient's ability to carry out ADLs; assess patient's baseline for ADL function and identify physical deficits which impact ability to perform ADLs (bathing, care of mouth/teeth, toileting, grooming, dressing, etc )  - Assess/evaluate cause of self-care deficits   - Assess range of motion  - Assess patient's mobility  - Assess patient's need for assistive devices and provide as appropriate  - Encourage maximum independence but intervene and supervise when necessary  - Involve family in performance of ADLs  - Assess for home care needs following discharge   - Consider OT consult to assist with ADL evaluation and planning for discharge  - Provide patient education as appropriate  Outcome: Progressing  Goal: Maintain proper alignment of affected body part  Description  INTERVENTIONS:  - Support, maintain and protect limb and body alignment  - Provide patient/ family with appropriate education  Outcome: Progressing     Problem: Nutrition/Hydration-ADULT  Goal: Nutrient/Hydration intake appropriate for improving, restoring or maintaining nutritional needs  Description  Monitor and assess patient's nutrition/hydration status for malnutrition  Collaborate with interdisciplinary team and initiate plan and interventions as ordered  Monitor patient's weight and dietary intake as ordered or per policy  Determine patient's food preferences and provide high-protein, high-caloric foods as appropriate       INTERVENTIONS:  - Monitor oral intake, urinary output, labs, and treatment plans  - Assess nutrition and hydration status and recommend course of action  - Evaluate amount of meals eaten  - Assist patient with eating if necessary   - Allow adequate time for meals  - Recommend/ encourage appropriate diets, oral nutritional supplements, and vitamin/mineral supplements  - Order, calculate, and assess calorie counts as needed  - Assess need for intravenous fluids  - Provide nutrition/hydration education as appropriate  - Include patient/family/caregiver in decisions related to nutrition    Outcome: Progressing     Problem: DISCHARGE PLANNING  Goal: Discharge to home or other facility with appropriate resources  Description  INTERVENTIONS:  - Identify barriers to discharge w/patient and caregiver  - Arrange for needed discharge resources and transportation as appropriate  - Identify discharge learning needs (meds, wound care, etc )  - Arrange for interpretive services to assist at discharge as needed  - Patient son should be at bedside at discharge  - Refer to Case Management Department for coordinating discharge planning if the patient needs post-hospital services based on physician/advanced practitioner order or complex needs related to functional status, cognitive ability, or social support system   Outcome: Progressing     Problem: Potential for Falls  Goal: Patient will remain free of falls  Description  INTERVENTIONS:  - Assess patient frequently for physical needs  -  Identify cognitive and physical deficits and behaviors that affect risk of falls    -  [place] fall precautions as indicated by assessment   - Educate patient/family on patient safety including physical limitations  - Instruct patient to call for assistance with activity based on assessment  - Modify environment to reduce risk of injury  - Consider OT/PT consult to assist with strengthening/mobility  Outcome: Progressing

## 2019-08-29 NOTE — PROGRESS NOTES
PALLIATIVE AND SUPPORTIVE CARE FAMILY CONFERENCE  Carmenza Bueno 80 y o  female MRN: [id number]        Time of Meetin19 1300h  Participants: Neurology, Internal Medicine, [facility], [facility], [facility] [employer] [facility], Patient's DIL Michelle  Patient Participation: unable to participate d/t global aphasia  Patient Support System: Patient's son and daughter, with whom she resides  Meeting Location: bedside    Advanced Directive of POLST available: POLST completed today at beside w/ family    A family meeting was held to discuss goals of care  This meeting was necessary for determine the appropriate course of treatment  Topics of Discussion:  · Discussed patient's hospital course, current plan of care, prognosis, and options for disposition  · 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  There is a small possibility that she could regain some functionality; there is a greater possibility that she may continue to decline and/or have further cerebrovascular events  · Prognosis is poor  · Family expressed a strong desire to take the patient home  · She can wake and tolerate a modified diet w/ assistance (she needs to be fed)  She remains an aspiration risk  · Patient would meet hospice criteria w/ CVA as primary hospice diagnosis  Family wishes the patient to have hospice in the home  PLAN:  · POLST completed  · Will place comfort orders, and referral to hospice  · Primary team is contacting   · Family wishes to have a hospital bed in the home (provided by hospice) prior to discharge, if possible  Time Involved in Meetin minutes, beginning at approximately  1300h and ending at approximately 1320h

## 2019-08-29 NOTE — SOCIAL WORK
LOS 3  GMLOS 2 6  CM received consult for hospice  CM spoke to renettaJose Cruz who confirmed the family wants hospice services for patient  Ashvin العلي requested [facility] hospice services as their choice  Referral is made to [address] [place], [place] [address]s hospice  Nurse and SLIM notified

## 2019-08-29 NOTE — ASSESSMENT & PLAN NOTE
· Patient presented with ambulatory dysfunction secondary to ataxia from possible stroke  · Patient had a witnessed fall 2 days prior to admit in her bathroom by her daughter-in-law  · Continue fall precautions

## 2019-08-29 NOTE — ASSESSMENT & PLAN NOTE
· Patient presented with urinalysis showing moderate leukocytes, occasional bacteria, innumerable WBCs, negative nitrites  · Urine culture negative, patient without fever    Suspect leukocytosis related to acute CVA rather than UTI  · Discontinue antibiotics, family in agreement

## 2019-08-29 NOTE — ASSESSMENT & PLAN NOTE
· Patient has elevated total cholesterol including elevated LDL and elevated triglycerides    · Comfort care, can continue statin

## 2019-08-29 NOTE — ASSESSMENT & PLAN NOTE
· Patient noticed to have aphasia prior to admission, MRI suggestive of multiple recent left-sided infarcts  · Echocardiogram LVEF 60% there is a moderate aortic stenosis  · Paroxysmal AFib, she is not a candidate for anticoagulation secondary to transition to hospice and high fall risk  · Can continue with aspirin and statin

## 2019-08-29 NOTE — CONSULTS
[facility] [address] [facility]  80 y o   female  /-01   MRN: [id number]  Encounter: [id number]    ASSESSMENT:    Patient Active Problem List   Diagnosis    Acute encephalopathy    Aphasia    Ambulatory dysfunction    New onset atrial fibrillation ([address] )    Accelerated hypertension    Hyperglycemia    Asymptomatic bacteriuria    Mixed hyperlipidemia    Stenosis of left internal carotid artery     89F w/ no known PMHx (excepting possible dementia) admitted 2 days post-fall w/ acute encephalopathy d/t CVA (seen on imaging), post-CVA deficits, right hemineglect, global aphasia, new-onset Afib, accelerated HTN, new-onset hyperglycemia  Goals of care discussion pending  [facility] contacted patient's DIL Althea Mathew via phone as no family members were present at bedside  Family meeting set up for 1pm on 8/29/19 to discuss goals of care  PLAN:    1  Symptom management:   Dysphagia - SLP consult appreciated; patient is at risk of aspiration but can continue modified diet at this time   No other symptoms requiring acute medical intervention at this time  2  Goals:    Will discuss at family meeting, which is set up for 1pm tomorrow (8/29/19) with son Rj Wynne and Liborio Mortimer  Code status: Level 3 - DNAR and DNI   Decisional apparatus:  Patient does not have capacity to make medical decisions on my exam today  If such capacity is lost, patient's substitute decision maker would default to patient's son Rj Wynne by [place] Act 169  Advance Directive / Living Will / POLST:  Nothing on file  We appreciate the opportunity to participate in this patient's care  We will continue to follow  Please do not hesitate to contact our on-call provider through our clinic answering service at [phone] should you have acute symptom control concerns      IDENTIFICATION:  Inpatient consult to Palliative Care  Consult performed by: Shayna Mendoza MD  Consult ordered by: Dimple Nix LENA      Reason for Consult / Principal Problem: Goals of care  HISTORY OF PRESENT ILLNESS:    Alla Cruz is a 80 y o  female with no known PMHx (excepting possible dementia, per chart review) who [facility] the [facility] ED on [facility]/26/19 with her son and DIL, 2 days after a fall in the home  She fell on her right side on the edge of the bathtub  Intially she was "fine" except for some pain in her R side  Gradually she became more confused and would not talk  She had progressive weakness of her RUL and RLL  In the ED she had near-total right hemineglect w/ aphasia, AMS, and was noted to be in new-onset Afib with accelerated HTN (SBP > 220), with new-onset hyperglycemia  CT head and neck shows chronic microangiopathic with old small left frontal cortex cortical infarct  MRI brain shows recent infarcts are seen in left anterior cerebral artery, frontal parietal junction and corpus callosum with scattered chronic microangiopathic changes  Neurology and Cardiology are following  Their impression is that the patient's CVA and post-CVA effects are still evolving, and with her carotid stenosis and Afib the likelihood of further CVAs is high  [facility] consulted for goals of care  Family has expressed the desire to avoid aggressive interventions and they are considering the patient's options  Interview and exam limited by: acute encephalopathy, global aphasia  Review of Systems   Unable to perform ROS: Other (acute encephalopathy, global aphasia)     Past Medical History:   Diagnosis Date    Dementia      History reviewed  No pertinent surgical history    Social History     Socioeconomic History    Marital status:      Spouse name: Not on file    Number of children: Not on file    Years of education: Not on file    Highest education level: Not on file   Occupational History    Not on file   Social Needs    Financial resource strain: Not on file    Food insecurity:     Worry: Not on file     Inability: Not on file   [employer] needs:     Medical: Not on file     Non-medical: Not on file   Tobacco Use    Smoking status: Never Smoker    Smokeless tobacco: Never Used   Substance and Sexual Activity    Alcohol use: Never     Frequency: Never    Drug use: Never    Sexual activity: Not on file   Lifestyle    Physical activity:     Days per week: Not on file     Minutes per session: Not on file    Stress: Not on file   Relationships    Social connections:     Talks on phone: Not on file     Gets together: Not on file     Attends Islam service: Not on file     Active member of club or organization: Not on file     Attends meetings of clubs or organizations: Not on file     Relationship status: Not on file    Intimate partner violence:     Fear of current or ex partner: Not on file     Emotionally abused: Not on file     Physically abused: Not on file     Forced sexual activity: Not on file   Other Topics Concern    Not on file   Social History Narrative    Not on file     History reviewed  No pertinent family history      MEDICATIONS / ALLERGIES:  all current active meds have been reviewed and current meds:   Current Facility-Administered Medications   Medication Dose Route Frequency    aspirin chewable tablet 81 mg  81 mg Oral Daily    atorvastatin (LIPITOR) tablet 40 mg  40 mg Oral Daily With Dinner    cephalexin (KEFLEX) capsule 250 mg  250 mg Oral Q12H MOE    dextran 70-hypromellose (GENTEAL TEARS) 0 1-0 3 % ophthalmic solution 1 drop  1 drop Both Eyes Q3H PRN    heparin (porcine) subcutaneous injection 5,000 Units  5,000 Units Subcutaneous Q12H [address]    hydrALAZINE (APRESOLINE) injection 10 mg  10 mg Intravenous Q6H PRN    hydrALAZINE (APRESOLINE) injection 5 mg  5 mg Intravenous Once    insulin lispro (HumaLOG) 100 units/mL subcutaneous injection 1-5 Units  1-5 Units Subcutaneous TID AC    insulin lispro (HumaLOG) 100 units/mL subcutaneous injection 1-5 Units  1-5 Units Subcutaneous HS    ondansetron (ZOFRAN) injection 4 mg  4 mg Intravenous Q4H PRN       No Known Allergies    OBJECTIVE:  /67   Pulse 87   Temp 99 7 °F (37 6 °C)   Resp 18   Ht 5' (1 524 m)   Wt 54 2 kg (119 lb 7 8 oz)   SpO2 94%   BMI 23 34 kg/m²   Physical Exam  Constitutional: Appears frail, pale  Appears to be in mild emotional distress as she cannot speak  Head: Normocephalic and atraumatic  Eyes: EOM are normal  No ocular discharge  No scleral icterus  Neck: No visible adenopathy or masses  CV: irregularly irregular rhythm; no murmur heard  Normal S1 and S2   Respiratory: Clear to auscultation bilaterally  No wheezes/rales/rhonchi  Effort normal  No stridor  No respiratory distress  Gastrointestinal: No abdominal distension  Musculoskeletal: No edema  Neurological: Alert  Able to follow simple commands when indicated visually or via touch  Globally aphasic  Skin: Dry, no diaphoresis  Psychiatric: Mild apprehension  Lab Results: I have personally reviewed pertinent labs  Imaging Studies: I have personally reviewed pertinent reports  EKG, Pathology, and Other Studies: I have personally reviewed pertinent reports  Counseling / Coordination of Care: Total floor / unit time spent today 25 minutes  Greater than 50% of total time was spent with the patient and / or family counseling and / or coordination of care  A description of the counseling / coordination of care: global aphasia, acute encepholpoathy, CVA, Afib, functional status, prognosis, goals of care      Raymond Holliday MD  [address] and Supportive Care

## 2019-08-29 NOTE — ASSESSMENT & PLAN NOTE
· Patient found to be in new onset AFib during evaluation in the ED  · CHADS2-VASc2 score-5  · Discussed with Neurology, Cardiology, palliative, and family - everyone is in agreement that she is not a good candidate for full anticoagulation, continue with aspirin alone

## 2019-08-29 NOTE — ASSESSMENT & PLAN NOTE
· Patient presented with the acute onset of encephalopathy as noticed by her son and daughter-in-law for 2 days after a fall  · Found to have multiple recent left-sided infarcts on MRI brain  · Neurology consulted, at this point in time her prognosis is pretty bleak    Palliative Care consulted, family meeting 8/29 resulting in transition to hospice care at home  · Consult to hospice liaison, case management consulted  · Continue speech therapy recommendations for food which is pureed diet with nectar thick liquids

## 2019-08-29 NOTE — ASSESSMENT & PLAN NOTE
· Patient presented with accelerated hypertension with SBP in the 220s    · Overall clinically improved

## 2019-08-29 NOTE — PROGRESS NOTES
Progress Note Jinx Query 11/19/1926, 80 y o  female MRN: [id number]    Unit/Bed#: -01 Encounter: [id number]    Primary Care Provider: No primary care provider on file  Date and time admitted to hospital: 8/26/2019 10:35 AM      * Acute encephalopathy  Assessment & Plan  · Patient presented with the acute onset of encephalopathy as noticed by her son and daughter-in-law for 2 days after a fall  · Found to have multiple recent left-sided infarcts on MRI brain  · Neurology consulted, at this point in time her prognosis is pretty bleak  Palliative Care consulted, family meeting 8/29 resulting in transition to hospice care at home  · Consult to hospice liaison, case management consulted  · Continue speech therapy recommendations for food which is pureed diet with nectar thick liquids    Aphasia  Assessment & Plan  · Patient noticed to have aphasia prior to admission, MRI suggestive of multiple recent left-sided infarcts  · Echocardiogram LVEF 60% there is a moderate aortic stenosis  · Paroxysmal AFib, she is not a candidate for anticoagulation secondary to transition to hospice and high fall risk    New onset atrial fibrillation ([address] )  Assessment & Plan  · Patient found to be in new onset AFib during evaluation in the ED  · CHADS2-VASc2 score-5  · Discussed with Neurology, Cardiology, palliative, and family - everyone is in agreement that she is not a good candidate for full anticoagulation    Stenosis of left internal carotid artery  Assessment & Plan  · Vascular surgery evaluated patient unfortunately given her age and frail state vascular does not feel patient is a surgical candidate for her greater than 70% stenosis of the left ICA    Accelerated hypertension  Assessment & Plan  · Patient presented with accelerated hypertension with SBP in the 220s    · Overall clinically improved, no routine meds required    Ambulatory dysfunction  Assessment & Plan  · Patient presented with ambulatory dysfunction secondary to ataxia from possible stroke  · Patient had a witnessed fall 2 days prior to admit in her bathroom by her daughter-in-law  · Continue fall precautions    Hyperglycemia  Assessment & Plan  · Patient has no known history of type 2 diabetes  · Patient with hyperglycemia on admission however A1c noted to be 7 1  · Comfort care, no diabetic diet warranted    Mixed hyperlipidemia  Assessment & Plan  · Patient has elevated total cholesterol including elevated LDL and elevated triglycerides  Asymptomatic bacteriuria  Assessment & Plan  · Patient presented with urinalysis showing moderate leukocytes, occasional bacteria, innumerable WBCs, negative nitrites  · Urine culture negative, patient without fever  Suspect leukocytosis related to acute CVA rather than UTI  · Discontinue antibiotics, family in agreement      VTE Pharmacologic Prophylaxis:   Pharmacologic: comfort measures  Mechanical VTE Prophylaxis in Place: Yes    Patient Centered Rounds: I have performed bedside rounds with nursing staff today  Discussions with Specialists or Other Care Team Provider: case management, palliative care, cardiology, neurology     Education and Discussions with Family / Patient: patient's DIL and son Malorie Harris) present     Time Spent for Care: 45 minutes  More than 50% of total time spent on counseling and coordination of care as described above  Current Length of Stay: 3 day(s)    Current Patient Status: Inpatient   Certification Statement: The patient will continue to require additional inpatient hospital stay due to transition to hospice at home    Discharge Plan: pending hospice placement     Code Status: Level 4 - Comfort Care    Subjective:   Patient seen this morning, minimally responsive  She does do some tracking  She is flaccid on the right side  Per nursing, no acute events overnight    Daughter-in-law on son at the bedside for family meeting, they express desire to transition to hospice    They want mother to be comfortable and at home with them  They feel they would be able to take care of her with supplement from hospice  They have multiple appropriate questions regarding transition to hospice, they are in agreement with holding on anticoagulation at this point as well as discontinuing antibiotics for the asymptomatic bacteriuria which was present on admission  Objective:     Vitals:   Temp (24hrs), Av 6 °F (37 6 °C), Min:99 °F (37 2 °C), Max:100 °F (37 8 °C)    Temp:  [99 °F (37 2 °C)-100 °F (37 8 °C)] 99 1 °F (37 3 °C)  HR:  [70-96] 71  Resp:  [17-30] 30  BP: (142-180)/(67-90) 155/79  SpO2:  [91 %-97 %] 97 %  Body mass index is 23 34 kg/m²  Input and Output Summary (last 24 hours): Intake/Output Summary (Last 24 hours) at 2019 1513  Last data filed at 2019 1300  Gross per 24 hour   Intake 480 ml   Output 100 ml   Net 380 ml       Physical Exam:     Physical Exam   Constitutional: She appears well-developed  No distress  HENT:   Mouth/Throat: Oropharynx is clear and moist    Cardiovascular: Normal rate, S1 normal, S2 normal and normal heart sounds  An irregular rhythm present  Pulmonary/Chest: Effort normal and breath sounds normal  No respiratory distress  She has no wheezes  She has no rales  No obvious distress   Abdominal: Soft  Bowel sounds are normal  She exhibits no distension  Musculoskeletal: She exhibits no edema  Neurological: She is alert  Flaccid RUE/RLE   Nursing note and vitals reviewed        Additional Data:     Labs:    Results from last 7 days   Lab Units 19  0441   WBC Thousand/uL 11 82*   HEMOGLOBIN g/dL 12 0   HEMATOCRIT % 36 7   PLATELETS Thousands/uL 168   NEUTROS PCT % 65   LYMPHS PCT % 22   MONOS PCT % 11   EOS PCT % 1     Results from last 7 days   Lab Units 19  0441   SODIUM mmol/L 137   POTASSIUM mmol/L 3 8   CHLORIDE mmol/L 103   CO2 mmol/L 27   BUN mg/dL 14   CREATININE mg/dL 0 91   ANION GAP mmol/L 7   CALCIUM mg/dL 8 7 ALBUMIN g/dL 2 9*   TOTAL BILIRUBIN mg/dL 0 50   ALK PHOS U/L 56   ALT U/L 11*   AST U/L 21   GLUCOSE RANDOM mg/dL 128     Results from last 7 days   Lab Units 08/26/19  1204   INR  1 02     Results from last 7 days   Lab Units 08/29/19  1123 08/29/19  0611 08/28/19  2120 08/28/19  1619 08/28/19  1100 08/28/19  0703 08/27/19  2044 08/27/19  1709 08/27/19  1157 08/27/19  0607 08/27/19  0018 08/26/19  2116   POC GLUCOSE mg/dl 234* 171* 260* 163* 220* 143* 212* 141* 166* 134 125 142*     Results from last 7 days   Lab Units 08/26/19  1204   HEMOGLOBIN A1C % 7 1*           * I Have Reviewed All Lab Data Listed Above  * Additional Pertinent Lab Tests Reviewed: All Labs Within Last 24 Hours Reviewed    Telemetry reviewed:  AFib      Recent Cultures (last 7 days):     Results from last 7 days   Lab Units 08/26/19  1244   URINE CULTURE  No Growth <1000 cfu/mL       Last 24 Hours Medication List:     Current Facility-Administered Medications:  bisacodyl 10 mg Rectal Daily PRN Wang Phipps MD   dextran 70-hypromellose 1 drop Both Eyes Q3H PRN LENA Norris   glycopyrrolate 0 2 mg Intravenous Q1H PRN Wang Phipps MD   haloperidol lactate 0 5 mg Intravenous Q1H PRN Wang Phipps MD   hydrALAZINE 5 mg Intravenous Once Melquiades Trinh MD   LORazepam 0 5 mg Intravenous Q1H PRN Wang Phipps MD   morphine injection 1 mg Intravenous Q1H PRN Wang Phipps MD   ondansetron 4 mg Intravenous Q4H PRN Kavita Hand MD        Today, Patient Was Seen By: Patrice Bean PA-C    ** Please Note: Dictation voice to text software may have been used in the creation of this document   **

## 2019-08-29 NOTE — ASSESSMENT & PLAN NOTE
· Vascular surgery evaluated patient unfortunately given her age and frail state vascular does not feel patient is a surgical candidate for her greater than 70% stenosis of the left ICA  · Recommend optimization with statin, aspirin therapy

## 2019-08-29 NOTE — PALLIATIVE CARE CONFERENCE
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

## 2019-08-30 PROBLEM — I63.9 CEREBROVASCULAR ACCIDENT (CVA) DUE TO EMBOLISM (HCC): Status: ACTIVE | Noted: 2019-08-30

## 2019-08-30 PROBLEM — R13.10 DYSPHAGIA: Status: ACTIVE | Noted: 2019-08-30

## 2019-08-30 PROCEDURE — 99238 HOSP IP/OBS DSCHRG MGMT 30/<: CPT | Performed by: PHYSICIAN ASSISTANT

## 2019-08-30 RX ORDER — MORPHINE SULFATE 100 MG/5ML
5 SOLUTION ORAL
Qty: 15 ML | Refills: 0 | Status: SHIPPED | OUTPATIENT
Start: 2019-08-30 | End: 2019-09-09

## 2019-08-30 RX ORDER — LORAZEPAM 0.5 MG/1
0.5 TABLET ORAL EVERY 6 HOURS PRN
Qty: 10 TABLET | Refills: 0 | Status: SHIPPED | OUTPATIENT
Start: 2019-08-30 | End: 2019-09-09

## 2019-08-30 RX ORDER — ACETAMINOPHEN 650 MG/1
650 SUPPOSITORY RECTAL EVERY 4 HOURS PRN
Qty: 4 SUPPOSITORY | Refills: 0 | Status: SHIPPED | OUTPATIENT
Start: 2019-08-30

## 2019-08-30 RX ADMIN — MORPHINE SULFATE 0.5 MG: 2 INJECTION, SOLUTION INTRAMUSCULAR; INTRAVENOUS at 10:19

## 2019-08-30 RX ADMIN — MORPHINE SULFATE 1 MG: 2 INJECTION, SOLUTION INTRAMUSCULAR; INTRAVENOUS at 13:03

## 2019-08-30 NOTE — ASSESSMENT & PLAN NOTE
· Vascular surgery evaluated patient unfortunately given her age and frail state vascular does not feel patient is a surgical candidate for her greater than 70% stenosis of the left ICA

## 2019-08-30 NOTE — PLAN OF CARE
Problem: Prexisting or High Potential for Compromised Skin Integrity  Goal: Skin integrity is maintained or improved  Description  INTERVENTIONS:  - Identify patients at risk for skin breakdown  - Assess and monitor skin integrity  - Assess and monitor nutrition and hydration status  - Monitor labs   - Assess for incontinence   - Turn and reposition patient  - Assist with mobility/ambulation  - Relieve pressure over bony prominences  - Avoid friction and shearing  - Provide appropriate hygiene as needed including keeping skin clean and dry  - Evaluate need for skin moisturizer/barrier cream  - Collaborate with interdisciplinary team   - Patient/family teaching  - Consider wound care consult   Outcome: Not Progressing     Problem: Neurological Deficit  Goal: Neurological status is stable or improving  Description  Interventions:  - Monitor and assess patient's level of consciousness, motor function, sensory function, and level of assistance needed for ADLs  - Monitor and report changes from baseline  Collaborate with interdisciplinary team to initiate plan and implement interventions as ordered  - Provide and maintain a safe environment  - Consider seizure precautions  - Consider fall precautions  - Consider aspiration precautions  - Consider bleeding precautions  Outcome: Not Progressing     Problem: Activity Intolerance/Impaired Mobility  Goal: Mobility/activity is maintained at optimum level for patient  Description  Interventions:  - Assess and monitor patient  barriers to mobility and need for assistive/adaptive devices  - Assess patient's emotional response to limitations  - Collaborate with interdisciplinary team and initiate plans and interventions as ordered  - Encourage independent activity per ability   - Maintain proper body alignment  - Perform active/passive rom as tolerated/ordered    - Plan activities to conserve energy   - Turn patient as appropriate  Outcome: Not Progressing Problem: Communication Impairment  Goal: Ability to express needs and understand communication  Description  Assess patient's communication skills and ability to understand information  Patient will demonstrate use of effective communication techniques, alternative methods of communication and understanding even if not able to speak  - Encourage communication and provide alternate methods of communication as needed  - Collaborate with case management/ for discharge needs  - Include patient/family/caregiver in decisions related to communication  Outcome: Not Progressing     Problem: Potential for Aspiration  Goal: Non-ventilated patient's risk of aspiration is minimized  Description  Assess and monitor vital signs, respiratory status, and labs (WBC)  Monitor for signs of aspiration (tachypnea, cough, rales, wheezing, cyanosis, fever)  - Assess and monitor patient's ability to swallow  - Place patient up in chair to eat if possible  - HOB up at 90 degrees to eat if unable to get patient up into chair   - Supervise patient during oral intake  - Instruct patient/ family to take small bites  - Instruct patient/ family to take small single sips when taking liquids  - Follow patient-specific strategies generated by speech pathologist   Outcome: Not Progressing     Problem: Nutrition  Goal: Nutrition/Hydration status is improving  Description  Monitor and assess patient's nutrition/hydration status for malnutrition (ex- brittle hair, bruises, dry skin, pale skin and conjunctiva, muscle wasting, smooth red tongue, and disorientation)  Collaborate with interdisciplinary team and initiate plan and interventions as ordered  Monitor patient's weight and dietary intake as ordered or per policy  Utilize nutrition screening tool and intervene per policy  Determine patient's food preferences and provide high-protein, high-caloric foods as appropriate       - Assist patient with eating   - Allow adequate time for meals   - Encourage patient to take dietary supplement as ordered  - Collaborate with clinical nutritionist   - Include patient/family/caregiver in decisions related to nutrition    Outcome: Not Progressing     Problem: CARDIOVASCULAR - ADULT  Goal: Maintains optimal cardiac output and hemodynamic stability  Description  INTERVENTIONS:  - Monitor I/O, vital signs and rhythm  - Monitor for S/S and trends of decreased cardiac output  - Administer and titrate ordered vasoactive medications to optimize hemodynamic stability  - Assess quality of pulses, skin color and temperature  - Assess for signs of decreased coronary artery perfusion  - Instruct patient to report change in severity of symptoms  Outcome: Not Progressing  Goal: Absence of cardiac dysrhythmias or at baseline rhythm  Description  INTERVENTIONS:  - Continuous cardiac monitoring, vital signs, obtain 12 lead EKG if ordered  - Administer antiarrhythmic and heart rate control medications as ordered  - Monitor electrolytes and administer replacement therapy as ordered  Outcome: Not Progressing     Problem: METABOLIC, FLUID AND ELECTROLYTES - ADULT  Goal: Electrolytes maintained within normal limits  Description  INTERVENTIONS:  - Monitor labs and assess patient for signs and symptoms of electrolyte imbalances  - Administer electrolyte replacement as ordered  - Monitor response to electrolyte replacements, including repeat lab results as appropriate  - Instruct patient on fluid and nutrition as appropriate  Outcome: Not Progressing  Goal: Fluid balance maintained  Description  INTERVENTIONS:  - Monitor labs   - Monitor I/O and WT  - Instruct patient on fluid and nutrition as appropriate  - Assess for signs & symptoms of volume excess or deficit  Outcome: Not Progressing  Goal: Glucose maintained within target range  Description  INTERVENTIONS:  - Monitor Blood Glucose as ordered  - Assess for signs and symptoms of hyperglycemia and hypoglycemia  - Administer ordered medications to maintain glucose within target range  - Assess nutritional intake and initiate nutrition service referral as needed  Outcome: Not Progressing     Problem: MUSCULOSKELETAL - ADULT  Goal: Maintain or return mobility to safest level of function  Description  INTERVENTIONS:  - Assess patient's ability to carry out ADLs; assess patient's baseline for ADL function and identify physical deficits which impact ability to perform ADLs (bathing, care of mouth/teeth, toileting, grooming, dressing, etc )  - Assess/evaluate cause of self-care deficits   - Assess range of motion  - Assess patient's mobility  - Assess patient's need for assistive devices and provide as appropriate  - Encourage maximum independence but intervene and supervise when necessary  - Involve family in performance of ADLs  - Assess for home care needs following discharge   - Consider OT consult to assist with ADL evaluation and planning for discharge  - Provide patient education as appropriate  Outcome: Not Progressing  Goal: Maintain proper alignment of affected body part  Description  INTERVENTIONS:  - Support, maintain and protect limb and body alignment  - Provide patient/ family with appropriate education  Outcome: Not Progressing     Problem: Nutrition/Hydration-ADULT  Goal: Nutrient/Hydration intake appropriate for improving, restoring or maintaining nutritional needs  Description  Monitor and assess patient's nutrition/hydration status for malnutrition  Collaborate with interdisciplinary team and initiate plan and interventions as ordered  Monitor patient's weight and dietary intake as ordered or per policy  Determine patient's food preferences and provide high-protein, high-caloric foods as appropriate       INTERVENTIONS:  - Monitor oral intake, urinary output, labs, and treatment plans  - Assess nutrition and hydration status and recommend course of action  - Evaluate amount of meals eaten  - Assist patient with eating if necessary   - Allow adequate time for meals  - Recommend/ encourage appropriate diets, oral nutritional supplements, and vitamin/mineral supplements  - Order, calculate, and assess calorie counts as needed  - Assess need for intravenous fluids  - Provide nutrition/hydration education as appropriate  - Include patient/family/caregiver in decisions related to nutrition    Outcome: Not Progressing     Problem: DISCHARGE PLANNING  Goal: Discharge to home or other facility with appropriate resources  Description  INTERVENTIONS:  - Identify barriers to discharge w/patient and caregiver  - Arrange for needed discharge resources and transportation as appropriate  - Identify discharge learning needs (meds, wound care, etc )  - Arrange for interpretive services to assist at discharge as needed  - Patient son should be at bedside at discharge  - Refer to Case Management Department for coordinating discharge planning if the patient needs post-hospital services based on physician/advanced practitioner order or complex needs related to functional status, cognitive ability, or social support system   Outcome: Not Progressing     Problem: Potential for Falls  Goal: Patient will remain free of falls  Description  INTERVENTIONS:  - Assess patient frequently for physical needs  -  Identify cognitive and physical deficits and behaviors that affect risk of falls    -  [place] fall precautions as indicated by assessment   - Educate patient/family on patient safety including physical limitations  - Instruct patient to call for assistance with activity based on assessment  - Modify environment to reduce risk of injury  - Consider OT/PT consult to assist with strengthening/mobility  Outcome: Not Progressing

## 2019-08-30 NOTE — ASSESSMENT & PLAN NOTE
· Patient presented with urinalysis showing moderate leukocytes, occasional bacteria, innumerable WBCs, negative nitrites  · Urine culture negative, patient without fever    Suspect leukocytosis related to acute CVA rather than UTI  · Discontinue antibiotics, completed 3 full days antibiotic treatment, family in agreement

## 2019-08-30 NOTE — DISCHARGE SUMMARY
Discharge- Darvin Garrido 11/19/1926, 80 y o  female MRN: [id number]    Unit/Bed#: -01 Encounter: [id number]    Primary Care Provider: No primary care provider on file  Date and time admitted to hospital: 8/26/2019 10:35 AM      * Acute encephalopathy  Assessment & Plan  · Secondary to acute embolic strokes  Patient found to have new onset paroxysmal AFib, multiple strokes on MRI brain, expressive aphasia, right-sided paralysis  Prognosis is currently poor, conversation held with the family on 08/29 along with palliative care Neurology  Consensus is hospice at this time, and that patient may come off of hospice if she makes clinical improvement at home  However the family understands that given the severity of her symptoms, we do not anticipate patient to make significant improvement    · Continue modified diet as per speech therapy recommendations (dysphagia level 1 pureed, nectar thick, 100% assistance), aspiration precautions    Aphasia  Assessment & Plan  · Patient noticed to have aphasia prior to admission, MRI suggestive of multiple recent left-sided infarcts  · Echocardiogram LVEF 60% there is a moderate aortic stenosis  · Paroxysmal AFib, she is not a candidate for anticoagulation secondary to transition to hospice and high fall risk    New onset atrial fibrillation ([facility] [address] )  Assessment & Plan  · Patient found to be in new onset AFib during evaluation in the ED  · CHADS2-VASc2 score-5  · Discussed with Neurology, Cardiology, palliative, and family - everyone is in agreement that she is not a good candidate for full anticoagulation    Stenosis of left internal carotid artery  Assessment & Plan  · Vascular surgery evaluated patient unfortunately given her age and frail state vascular does not feel patient is a surgical candidate for her greater than 70% stenosis of the left ICA    Accelerated hypertension  Assessment & Plan  · Patient presented with accelerated hypertension with SBP in the 220s   · Overall clinically improved    Ambulatory dysfunction  Assessment & Plan  · Patient presented with ambulatory dysfunction secondary to ataxia from stroke, fall precautions    Hyperglycemia  Assessment & Plan  · Patient has no known history of type 2 diabetes  · Patient with hyperglycemia on admission however A1c noted to be 7 1    Mixed hyperlipidemia  Assessment & Plan  · Patient has elevated total cholesterol including elevated LDL and elevated triglycerides  Asymptomatic bacteriuria  Assessment & Plan  · Patient presented with urinalysis showing moderate leukocytes, occasional bacteria, innumerable WBCs, negative nitrites  · Urine culture negative, patient without fever  Suspect leukocytosis related to acute CVA rather than UTI  · Discontinue antibiotics, completed 3 full days antibiotic treatment, family in agreement        Discharging Physician / Practitioner: Ronnie Cabral PA-C  PCP: No primary care provider on file    Admission Date:   Admission Orders (From admission, onward)     Ordered        08/26/19 1447  Inpatient Admission (expected length of stay for this patient Order details is greater than two midnights)  Once                   Discharge Date: 08/30/19    Resolved Problems  Date Reviewed: 8/30/2019    None          Consultations During Hospital Stay:  · Neurology  · Cardiology  · Palliative care  · PT, OT, SLP  · Case management for discharge planning    Procedures Performed:   · CT head, CTA head neck, MRI brain, CT cervical spine, echocardiogram    Significant Findings / Test Results:   · > 7% stenosis left ICA, 40% stenosis right ICA  · Multiple left anterior cerebral artery infarcts    Incidental Findings:   ·      Test Results Pending at Discharge (will require follow up):   ·      Outpatient Tests Requested:  ·     Complications:      Reason for Admission:  Stroke    Hospital Course:     Beatriz Smith is a 80 y o  female patient who originally presented to the hospital on 8/26/2019 due to stroke-like symptoms  Patient was in her usual state of health until 8/24  She suffered from a fall that day and was not herself recent encephalopathy, difficulty ambulating, and difficulty speaking  Family brought her to the hospital, unfortunately she was found to have multiple strokes, possibly from the left ICA stenosis vs new onset AFib  Neurology consulted, prognosis is quite poor given the severity of her symptoms  Family meeting held on 08/29 with multiple providers present  Prognosis was discussed with the family, and they wished to take the patient home on hospice  Please see above list of diagnoses and related plan for additional information  Condition at Discharge: stable     Discharge Day Visit / Exam:     Subjective:  Patient seen this morning, she is minimally responsive in sleeping soundly  She does withdraw on the left side to painful stimuli  Does not appear to be any acute distress  Vitals: Blood Pressure: 155/79 (08/29/19 1131)  Pulse: 71 (08/29/19 1131)  Temperature: 99 1 °F (37 3 °C) (08/29/19 1131)  Temp Source: Axillary (08/29/19 0300)  Respirations: (!) 30 (08/29/19 1512)  Height: 5' (152 4 cm) (08/27/19 1618)  Weight - Scale: 54 2 kg (119 lb 7 8 oz) (08/27/19 1618)  SpO2: 97 % (08/29/19 1131)  Exam:   Physical Exam   Constitutional: She appears well-developed  She is sleeping  No distress  Cardiovascular: Normal rate, regular rhythm, S1 normal and S2 normal    Murmur heard  Pulmonary/Chest: Effort normal and breath sounds normal  Tachypnea noted  No respiratory distress  Abdominal: Soft  Bowel sounds are normal    Musculoskeletal: She exhibits no edema  Neurological:   Right-sided paresis   Nursing note and vitals reviewed  Discussion with Family:     Discharge instructions/Information to patient and family:   See after visit summary for information provided to patient and family        Provisions for Follow-Up Care:  See after visit summary for information related to follow-up care and any pertinent home health orders  Disposition:     Other: Home, hospice    Planned Readmission: none     Discharge Statement:  I spent 20 minutes discharging the patient  This time was spent on the day of discharge  I had direct contact with the patient on the day of discharge  Greater than 50% of the total time was spent examining patient, answering all patient questions, arranging and discussing plan of care with patient as well as directly providing post-discharge instructions  Additional time then spent on discharge activities  Discharge Medications:  See after visit summary for reconciled discharge medications provided to patient and family        ** Please Note: This note has been constructed using a voice recognition system **

## 2019-08-30 NOTE — DISCHARGE INSTRUCTIONS
Lorazepam (By mouth)   Lorazepam ([id number])  Treats anxiety  Brand Name(s): Ativan, LORazepam Intensol   There may be other brand names for this medicine  When This Medicine Should Not Be Used: This medicine is not right for everyone  Do not use it if you had an allergic reaction to lorazepam or similar medicines, or you are pregnant or breastfeeding, or you have acute narrow-angle glaucoma  How to Use This Medicine:   Liquid, Tablet  · Take your medicine as directed  Your dose may need to be changed several times to find what works best for you  · Oral liquid:   ¨ Measure the oral liquid medicine with a marked measuring spoon, oral syringe, or medicine cup  ¨ Mix the medicine with water, juice, soda, applesauce, or pudding  Drink or eat the mixture right away  Do not store it for later use  · This medicine should come with a Medication Guide  Ask your pharmacist for a copy if you do not have one  · Missed dose: Take a dose as soon as you remember  If you are more than 1 hour late, skip the missed dose and wait until it is time for your next dose  Do not use extra medicine to make up for a missed dose  ·   ¨ Oral liquid: Refrigerate the oral liquid  Throw away an opened bottle after 90 days  ¨ Tablets: Store the medicine in a closed container at room temperature, away from heat, moisture, and direct light  Drugs and Foods to Avoid:   Ask your doctor or pharmacist before using any other medicine, including over-the-counter medicines, vitamins, and herbal products  · Some medicines can affect how lorazepam works  Tell your doctor if you are using any of the following:   ¨ Aminophylline, clozapine, probenecid, theophylline, valproate  ¨ Medicine to treat depression or mental health problems  ¨ Medicine to treat seizures  · Do not drink alcohol while you are using this medicine  · Tell your doctor if you use anything else that makes you sleepy   Some examples are allergy medicine, narcotic pain medicine, and alcohol  Warnings While Using This Medicine:   · It is not safe to take this medicine during pregnancy  It could harm an unborn baby  Tell your doctor right away if you become pregnant  · Tell your doctor if you have kidney disease, liver disease, lung or breathing problems (such as COPD, sleep apnea), or a history of drug or alcohol abuse, depression, or seizures  · This medicine can be habit-forming  Do not use more than your prescribed dose  Call your doctor if you think your medicine is not working  · Do not stop using this medicine suddenly  Your doctor will need to slowly decrease your dose before you stop it completely  · This medicine may make you drowsy  Do not drive or do anything else that could be dangerous until you know how this medicine affects you  · Your doctor will do lab tests at regular visits to check on the effects of this medicine  Keep all appointments  · Keep all medicine out of the reach of children  Never share your medicine with anyone  Possible Side Effects While Using This Medicine:   Call your doctor right away if you notice any of these side effects:  · Allergic reaction: Itching or hives, swelling in your face or hands, swelling or tingling in your mouth or throat, chest tightness, trouble breathing  · Confusion, unusual mood or behavior, thoughts of hurting yourself  · Seizures  · Severe drowsiness or weakness, slow heartbeat, trouble breathing  · Worsening of depression  If you notice these less serious side effects, talk with your doctor:   · Dizziness, clumsiness  If you notice other side effects that you think are caused by this medicine, tell your doctor  Call your doctor for medical advice about side effects  You may report side effects to FDA at 6-557-FDA-4778  © 2017 [employer] [employer] Information is for End User's use only and may not be sold, redistributed or otherwise used for commercial purposes    The above information is an educational aid only  It is not intended as medical advice for individual conditions or treatments  Talk to your doctor, nurse or pharmacist before following any medical regimen to see if it is safe and effective for you

## 2019-08-30 NOTE — SOCIAL WORK
LOS 4  GMLOS 2 6  Received Jalousier message from [facility],[address] they are at capacity and cannot accept patient  Family requested to  [facility] as choice  Barak Nelson from [facility] phoned family  Family is agreeable to signing patient on hospice when patient arrives home  CM is awaitng hospital be delivery which will be between 2 and  3PM today  Patient will be transported home via stretcher today  CM is awaiting a call back from [facility] with a time for transport  Nurse and SLIM informed

## 2019-08-30 NOTE — ASSESSMENT & PLAN NOTE
· Patient noticed to have aphasia prior to admission, MRI suggestive of multiple recent left-sided infarcts  · Echocardiogram LVEF 60% there is a moderate aortic stenosis  · Paroxysmal AFib, she is not a candidate for anticoagulation secondary to transition to hospice and high fall risk

## 2019-08-30 NOTE — SPEECH THERAPY NOTE
Chart reviewed; patient now with transition to hospice care at home   Will sign off Please reconsult if further immediate skilled SLP needs are warranted

## 2019-08-30 NOTE — PHYSICAL THERAPY NOTE
Physical Therapy Cancellation Note    At this time, PT treatment cancelled secondary to patient now with transition to hospice care at home/comfort measures initiated per chart review  Will sign off, D/C PT  Please reconsult if further immediate skilled PT needs are warranted      Mary Webb, PT, DPT

## 2019-08-30 NOTE — ASSESSMENT & PLAN NOTE
· Patient found to be in new onset AFib during evaluation in the ED  · CHADS2-VASc2 score-5  · Discussed with Neurology, Cardiology, palliative, and family - everyone is in agreement that she is not a good candidate for full anticoagulation

## 2019-08-30 NOTE — SOCIAL WORK
LOS 4 GMLOS 2 6  [facility]  Phoned to inform patient will be transported home at [place] [facility] today with PMR  Medical necessity is in the chart  Patient will go home and then be admitted into hospice  Family, [address], nurse and SLIM  Informed

## 2019-08-30 NOTE — TRANSPORTATION MEDICAL NECESSITY
Section I - General Information    Name of Patient: Juan Hall                 : 1926    Medicare #: [id number]  Transport Date: 19 (PCS is valid for round trips on this date and for all repetitive trips in the 60-day range as noted below )  Origin: [address]: Home: [facility] [address] [place], [address]  Is the pt's stay covered under Medicare Part A (PPS/DRG)   [x]     Closest appropriate facility? If no, why is transport to more distant facility required? Yes  If hospice pt, is this transport related to pt's terminal illness? No       Section II - Medical Necessity Questionnaire  Ambulance transportation is medically necessary only if other means of transport are contraindicated or would be potentially harmful to the patient  To meet this requirement, the patient must either be "bed confined" or suffer from a condition such that transport by means other than ambulance is contraindicated by the patient's condition  The following questions must be answered by the medical professional signing below for this form to be valid:    1)  Describe the MEDICAL CONDITION (physical and/or mental) of this patient AT [address] [place] [address] that requires the patient to be transported in an ambulance and why transport by other means is contraindicated by the patient's condition:CVA, right sided weakness, bedbound  Altered mental status  Acute encephalopathy  Patient is weak and does not ambulate  2) Is the patient "bed confined" as defined below? Yes  To be "be confined" the patient must satisfy all three of the following conditions: (1) unable to get up from bed without Assistance; AND (2) unable to ambulate; AND (3) unable to sit in a chair or wheelchair      3) Can this patient safely be transported by car or wheelchair van (i e , seated during transport without a medical attendant or monitoring)? No    4) In addition to completing questions 1-3 above, please check any of the following conditions that apply*:   *Note: supporting documentation for any boxes checked must be maintained in the patient's medical records  If hosp-hosp transfer, describe services needed at 2nd facility not available at 1st facility? Patient is confused  Medical attendant required   Unable to tolerate seated position for time needed to transport       Section III - Signature of Physician or Healthcare Professional  I certify that the above information is true and correct based on my evaluation of this patient, and represent that the patient requires transport by ambulance and that other forms of transport are contraindicated  I understand that this information will be used by the Centers for Medicare and Medicaid Services (CMS) to support the determination of medical necessity for ambulance services, and I represent that I have personal knowledge of the patient's condition at time of transport  []  If this box is checked, I also certify that the patient is physically or mentally incapable of signing the ambulance service's claim and that the institution with which I am affiliated has furnished care, services, or assistance to the patient  My signature below is made on behalf of the patient pursuant to 42 CFR §424 36(b)(4)  In accordance with 42 CFR §424 37, the specific reason(s) that the patient is physically or mentally incapable of signing the claim form is as follows: N/A        Signature of Physician* or Healthcare Professional______________________________________________________________  Signature Date 08/30/19 (For scheduled repetitive transports, this form is not valid for transports performed more than 60 days after this date)    Printed Name & Credentials of Physician or Healthcare Professional (MD, DO, RN, etc )_BASSEM Torres __  *Form must be signed by patient's attending physician for scheduled, repetitive transports   For non-repetitive, unscheduled ambulance transports, if unable to obtain the signature of the attending physician, any of the following may sign (choose appropriate option below)  [] Physician Assistant []  Clinical Nurse Specialist []  Registered Nurse  []  Nurse Practitioner  [x] Discharge Planner

## 2019-08-30 NOTE — ASSESSMENT & PLAN NOTE
· Secondary to acute embolic strokes  Patient found to have new onset paroxysmal AFib, multiple strokes on MRI brain, expressive and receptive aphasia, right-sided paralysis  Prognosis is currently poor, conversation held with the family on 08/29 along with palliative care Neurology  Consensus is hospice at this time, and that patient may come off of hospice if she makes clinical improvement at home  However the family understands that given the severity of her symptoms, we do not anticipate patient to make significant improvement    · Continue modified diet as per speech therapy recommendations (dysphagia level 1 pureed, nectar thick, 100% assistance), aspiration precautions

## 2019-08-30 NOTE — PROGRESS NOTES
Progress Note - Palliative & Supportive Care  Seven Barcenas  80 y o   female  /-01   MRN: [id number]  Encounter: [id number]     ASSESSMENT:    Patient Active Problem List   Diagnosis    Acute encephalopathy    Aphasia    Ambulatory dysfunction    New onset atrial fibrillation (HCC)    Accelerated hypertension    Hyperglycemia    Asymptomatic bacteriuria    Mixed hyperlipidemia    Stenosis of left internal carotid artery    Cerebrovascular accident (CVA) due to embolism (HCC)    Dysphagia     89F w/ no known PMHx (excepting possible dementia) admitted 2 days post-fall w/ acute encephalopathy d/t CVA (seen on imaging), post-CVA deficits, right hemineglect, global aphasia, new-onset Afib, accelerated HTN, new-onset hyperglycemia  To go home w/ hospice later today  PLAN:    1  Symptom management:  · Dysphagia - SLP consult appreciated; patient is at risk of aspiration but can continue modified diet / comfort feeds  · Patient tachypneic to 30 which could be a sign of discomfort  Trialed 0 5mg morphine IV w/o adverse effect - moving forward can continue w/ prescribed 1mg morphine q1h PRN (or increase to 2mg q1h PRN)   No other symptoms requiring acute medical intervention at this time  2  Goals:    Patient going home with hospice services later today w/ [facility]  Equipment (including hospital bed) to be delivered prior to arrival   [facility] team giving prescriptions for comfort in the home  Code status: Level 4 - Comfort Care   Decisional apparatus:  Patient does not have capacity to make medical decisions on my exam today  If such capacity is lost, patient's substitute decision maker would default to her son Chandana Sales by [place] Act 169  Advance Directive / Living Will / POLST:  POLST completed 8/30/19  3  Prognosis: weeks to months  We appreciate the opportunity to participate in this patient's care  We will continue to follow   Please do not hesitate to contact our on-call provider through our clinic answering service at [phone] should you have acute symptom control concerns  INTERVAL HISTORY:    Patient seen at bedside  Initially asleep but woke easily to gentle verbal stimuli  Still aphasic w/ hemineglect and hemiparesis  Patient tachypneic to 30 which could be a sign of discomfort  Started on PRN 1mg morphine IV yesterday but no doses required as of yet  Review of Systems   Unable to perform ROS: Patient nonverbal (encephalopathy and globally aphasic d/t CVA)     All other systems are negative  MEDICATIONS / ALLERGIES:  all current active meds have been reviewed and current meds:   Current Facility-Administered Medications   Medication Dose Route Frequency    bisacodyl (DULCOLAX) rectal suppository 10 mg  10 mg Rectal Daily PRN    dextran 70-hypromellose (GENTEAL TEARS) 0 1-0 3 % ophthalmic solution 1 drop  1 drop Both Eyes Q3H PRN    glycopyrrolate (ROBINUL) injection 0 2 mg  0 2 mg Intravenous Q1H PRN    haloperidol lactate (HALDOL) injection 0 5 mg  0 5 mg Intravenous Q1H PRN    hydrALAZINE (APRESOLINE) injection 5 mg  5 mg Intravenous Once    LORazepam (ATIVAN) 2 mg/mL injection 0 5 mg  0 5 mg Intravenous Q1H PRN    morphine injection 1 mg  1 mg Intravenous Q1H PRN    ondansetron (ZOFRAN) injection 4 mg  4 mg Intravenous Q4H PRN       No Known Allergies    OBJECTIVE:  /79   Pulse 71   Temp 99 1 °F (37 3 °C)   Resp (!) 30   Ht 5' (1 524 m)   Wt 54 2 kg (119 lb 7 8 oz)   SpO2 97%   BMI 23 34 kg/m²   Physical Exam  Constitutional: Frail, pale elderly woman  Slightly tachypneic to 30 but otherwise no signs of emotional or physical distress  Head: Normocephalic and atraumatic  Eyes: EOM are normal  No ocular discharge  No scleral icterus  Neck: No visible adenopathy or masses  Respiratory: No wheezes/rales/rhonchi  Effort normal  No stridor  Tachypneic to 30  Gastrointestinal: No abdominal distension  Musculoskeletal: No edema  Neurological: Initially asleep but woke to gentle verbal stimuli  Afterwards, eyes tracking  Alert  Globally aphasic  Not following commands  Skin: Dry, no diaphoresis  Lab Results: No recent pertinent labs  Imaging Studies: No recent pertinent studies  EKG, Pathology, and Other Studies: No recent pertinent studies  Counseling / Coordination of Care: Total floor / unit time spent today 25 minutes  Greater than 50% of total time was spent with the patient and / or family counseling and / or coordination of care  A description of the counseling / coordination of care: global aphasia, acute encepholpoathy, CVA, pain and other symptom management, hospice services      Wang Phipps MD  [address] and Supportive Care

## 2019-08-30 NOTE — ASSESSMENT & PLAN NOTE
· Patient has no known history of type 2 diabetes    · Patient with hyperglycemia on admission however A1c noted to be 7 1

## 2019-09-03 ENCOUNTER — TELEPHONE (OUTPATIENT)
Dept: NEUROLOGY | Facility: CLINIC | Age: 84
End: 2019-09-03

## 2019-09-03 NOTE — TELEPHONE ENCOUNTER
----- Message from Gin Torres PA-C sent at 8/28/2019  2:07 PM EDT -----  Regarding: HFU  Diagnosis/Reason for follow-up:  Acute stroke  Subspecialty for follow-up:  Stroke team  Recommended timing for HFU:  4-6 weeks  Existing neurologist:  None  Tests/Labs/Imaging ordered:  None  Orders placed electronically:  None  Additional notes:  None    Thank you!

## 2022-06-10 NOTE — CONSULTS
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    MRI of brain with and without contrast ordered  Awaiting results of MRI, will make final recommendations following completion of MRI  If findings of MRI suggestive of ICA stenosis as underlying etiology of encephalopathy, will need to discuss with patient's family regarding goals of care as patient is poor surgical candidate given age and dementia  Frequent neuro checks  Neurology following, Cardiology consulted for new findings of afib  Diet as tolerated at this time  Speech eval appreciated  Aspirin 81mg daily   SLIM primary     History of Present Illness     HPI:  [facility] is a 80 y o  female with no previous medical history who presents with acute encephalopathy  Patient currently with altered mentation and unable to provide history and family not in room at time of examination  Per chart, patient fell approximately 2 days ago on the edge of the bathtub  Per son, patient had been doing fine after fall with only some right sided pain  Unfortunately patient developed increase confusion and was no able to to talk or answer questions the following day  Family also noted weakness on her right upper and lower extremities  Upon presentation to the ED, patient was found to be in afib with systolic blood pressure >106Y  Per family, patient had no prior history of atrial fibrillation  Also noted elevated blood sugar and possible UTI  CT of the head without contrast showed no acute intracranial process  Noted chronic microangiopathy and 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   CTA I will send a 2 week supply of the head and neck with contrasted showed findings of old infarction consistent with CT of head and neck, and findings of >70% stenosis of left ICA  Neurology was consulted  MRI was ordered and patient was started on aspirin  Given findings of ICA stenosis, vascular surgery was consulted  At this time, patient is in room  Tracks movement but does not respond to questioning or commands  Patient moving left upper extremity without difficulty  Right upper extremity flacid, with weak gripping motion when holding hand  Review of Systems   Unable to perform ROS: Patient nonverbal       Historical Information   Past Medical History:   Diagnosis Date    Dementia      History reviewed  No pertinent surgical history  Social History   Social History     Substance and Sexual Activity   Alcohol Use Never    Frequency: Never     Social History     Substance and Sexual Activity   Drug Use Never     Social History     Tobacco Use   Smoking Status Never Smoker   Smokeless Tobacco Never Used     Family History: History reviewed  No pertinent family history      Meds/Allergies   PTA meds:   None     No Known Allergies    Objective   First Vitals:   Blood Pressure: (!) 199/82 (08/26/19 1035)  Pulse: 67 (08/26/19 1035)  Temperature: 97 9 °F (36 6 °C) (08/26/19 1035)  Temp Source: Oral (08/26/19 1035)  Respirations: 18 (08/26/19 1035)  Height: 5' (152 4 cm) (08/26/19 1035)  Weight - Scale: 54 2 kg (119 lb 7 8 oz) (08/26/19 1035)  SpO2: 97 % (08/26/19 1035)    Current Vitals:   Blood Pressure: 126/79 (08/27/19 1056)  Pulse: 73 (08/27/19 1056)  Temperature: 98 2 °F (36 8 °C) (08/27/19 1056)  Temp Source: Axillary (08/27/19 0130)  Respirations: 18 (08/27/19 0330)  Height: 5' (152 4 cm) (08/26/19 1035)  Weight - Scale: 54 2 kg (119 lb 7 8 oz) (08/26/19 1035)  SpO2: 96 % (08/27/19 1056)      Intake/Output Summary (Last 24 hours) at 8/27/2019 1256  Last data filed at 8/27/2019 0344  Gross per 24 hour   Intake 900 ml   Output 100 ml Net 800 ml       Invasive Devices     Peripheral Intravenous Line            Peripheral IV 08/26/19 Right Antecubital 1 day    Peripheral IV 08/26/19 Right Wrist less than 1 day          Drain            External Urinary Catheter 1 day                Physical Exam   Constitutional: She appears well-developed and well-nourished  No distress  HENT:   Head: Normocephalic and atraumatic  Right Ear: External ear normal    Left Ear: External ear normal    Mouth/Throat: Oropharynx is clear and moist  No oropharyngeal exudate  Eyes: Pupils are equal, round, and reactive to light  Conjunctivae and EOM are normal    Neck: Normal range of motion  Neck supple  No tracheal deviation present  No thyromegaly present  Cardiovascular: Normal rate, regular rhythm, normal heart sounds and intact distal pulses  Exam reveals no gallop and no friction rub  No murmur heard  Pulmonary/Chest: Effort normal and breath sounds normal  No respiratory distress  She has no wheezes  She has no rales  She exhibits no tenderness  Abdominal: Soft  Bowel sounds are normal  She exhibits no distension  There is no tenderness  There is no rebound and no guarding  Musculoskeletal: Normal range of motion  She exhibits no edema, tenderness or deformity  Neurological: She has normal reflexes  Patient awake and smiling, tracks movement with eyes, does not respond to commands or questions, spontaneously lifts left arm without difficulty, no movement seen on right arm, weak 1/5  strength noted with stimulation on right hand   Skin: Skin is warm and dry  No rash noted  She is not diaphoretic  No erythema  No pallor  Lab Results: I have personally reviewed pertinent lab results      Recent Results (from the past 36 hour(s))   CBC and differential    Collection Time: 08/26/19 12:04 PM   Result Value Ref Range    WBC 12 87 (H) 4 31 - 10 16 Thousand/uL    RBC 4 21 3 81 - 5 12 Million/uL    Hemoglobin 12 9 11 5 - 15 4 g/dL    Hematocrit 39 1 34 8 - 46 1 %    MCV 93 82 - 98 fL    MCH 30 6 26 8 - 34 3 pg    MCHC 33 0 31 4 - 37 4 g/dL    RDW 13 0 11 6 - 15 1 %    MPV 12 1 8 9 - 12 7 fL    Platelets 631 (L) 809 - 390 Thousands/uL    nRBC 0 /100 WBCs    Neutrophils Relative 72 43 - 75 %    Immat GRANS % 1 0 - 2 %    Lymphocytes Relative 15 14 - 44 %    Monocytes Relative 10 4 - 12 %    Eosinophils Relative 1 0 - 6 %    Basophils Relative 1 0 - 1 %    Neutrophils Absolute 9 40 (H) 1 85 - 7 62 Thousands/µL    Immature Grans Absolute 0 06 0 00 - 0 20 Thousand/uL    Lymphocytes Absolute 1 95 0 60 - 4 47 Thousands/µL    Monocytes Absolute 1 27 (H) 0 17 - 1 22 Thousand/µL    Eosinophils Absolute 0 12 0 00 - 0 61 Thousand/µL    Basophils Absolute 0 07 0 00 - 0 10 Thousands/µL   Basic metabolic panel    Collection Time: 08/26/19 12:04 PM   Result Value Ref Range    Sodium 138 136 - 145 mmol/L    Potassium 3 5 3 5 - 5 3 mmol/L    Chloride 100 100 - 108 mmol/L    CO2 29 21 - 32 mmol/L    ANION GAP 9 4 - 13 mmol/L    BUN 17 5 - 25 mg/dL    Creatinine 1 19 0 60 - 1 30 mg/dL    Glucose 253 (H) 65 - 140 mg/dL    Calcium 9 3 8 3 - 10 1 mg/dL    eGFR 40 ml/min/1 73sq m   Hepatic function panel    Collection Time: 08/26/19 12:04 PM   Result Value Ref Range    Total Bilirubin 0 60 0 20 - 1 00 mg/dL    Bilirubin, Direct 0 20 0 00 - 0 20 mg/dL    Alkaline Phosphatase 65 46 - 116 U/L    AST 17 5 - 45 U/L    ALT 14 12 - 78 U/L    Total Protein 7 6 6 4 - 8 2 g/dL    Albumin 3 3 (L) 3 5 - 5 0 g/dL   APTT    Collection Time: 08/26/19 12:04 PM   Result Value Ref Range    PTT 21 (L) 23 - 37 seconds   Protime-INR    Collection Time: 08/26/19 12:04 PM   Result Value Ref Range    Protime 13 4 11 6 - 14 5 seconds    INR 1 02 0 84 - 1 19   Troponin I    Collection Time: 08/26/19 12:04 PM   Result Value Ref Range    Troponin I <0 02 <=0 04 ng/mL   Hemoglobin A1C    Collection Time: 08/26/19 12:04 PM   Result Value Ref Range    Hemoglobin A1C 7 1 (H) 4 2 - 6 3 %     mg/dl   Lipid Panel with Direct LDL reflex    Collection Time: 08/26/19 12:04 PM   Result Value Ref Range    Cholesterol 206 (H) 50 - 200 mg/dL    Triglycerides 176 (H) <=150 mg/dL    HDL, Direct 42 40 - 60 mg/dL    LDL Calculated 129 (H) 0 - 100 mg/dL   UA w Reflex to Microscopic w Reflex to Culture    Collection Time: 08/26/19 12:44 PM   Result Value Ref Range    Color, UA Yellow     Clarity, UA Cloudy     Specific Gravity, UA >=1 030 1 003 - 1 030    pH, UA 6 0 4 5, 5 0, 5 5, 6 0, 6 5, 7 0, 7 5, 8 0    Leukocytes, UA Moderate (A) Negative    Nitrite, UA Negative Negative    Protein,  (2+) (A) Negative mg/dl    Glucose, UA Negative Negative mg/dl    Ketones, UA Trace (A) Negative mg/dl    Urobilinogen, UA 1 0 0 2, 1 0 E U /dl E U /dl    Bilirubin, UA Negative Negative    Blood, UA Moderate (A) Negative   Urine Microscopic    Collection Time: 08/26/19 12:44 PM   Result Value Ref Range    RBC, UA None Seen None Seen, 0-5 /hpf    WBC, UA Innumerable (A) None Seen, 0-5, 5-55, 5-65 /hpf    Epithelial Cells None Seen None Seen, Occasional /hpf    Bacteria, UA Occasional None Seen, Occasional /hpf   Platelet count    Collection Time: 08/26/19  5:20 PM   Result Value Ref Range    Platelets 006 483 - 330 Thousands/uL    MPV 11 2 8 9 - 12 7 fL   Fingerstick Glucose (POCT)    Collection Time: 08/26/19  5:27 PM   Result Value Ref Range    POC Glucose 165 (H) 65 - 140 mg/dl   Fingerstick Glucose (POCT)    Collection Time: 08/26/19  9:16 PM   Result Value Ref Range    POC Glucose 142 (H) 65 - 140 mg/dl   Fingerstick Glucose (POCT)    Collection Time: 08/27/19 12:18 AM   Result Value Ref Range    POC Glucose 125 65 - 140 mg/dl   Comprehensive metabolic panel    Collection Time: 08/27/19  4:41 AM   Result Value Ref Range    Sodium 137 136 - 145 mmol/L    Potassium 3 8 3 5 - 5 3 mmol/L    Chloride 103 100 - 108 mmol/L    CO2 27 21 - 32 mmol/L    ANION GAP 7 4 - 13 mmol/L    BUN 14 5 - 25 mg/dL    Creatinine 0 91 0 60 - 1 30 mg/dL Glucose 128 65 - 140 mg/dL    Calcium 8 7 8 3 - 10 1 mg/dL    AST 21 5 - 45 U/L    ALT 11 (L) 12 - 78 U/L    Alkaline Phosphatase 56 46 - 116 U/L    Total Protein 6 8 6 4 - 8 2 g/dL    Albumin 2 9 (L) 3 5 - 5 0 g/dL    Total Bilirubin 0 50 0 20 - 1 00 mg/dL    eGFR 55 ml/min/1 73sq m   Magnesium    Collection Time: 08/27/19  4:41 AM   Result Value Ref Range    Magnesium 2 0 1 6 - 2 6 mg/dL   Phosphorus    Collection Time: 08/27/19  4:41 AM   Result Value Ref Range    Phosphorus 3 6 2 3 - 4 1 mg/dL   CBC and differential    Collection Time: 08/27/19  4:41 AM   Result Value Ref Range    WBC 11 82 (H) 4 31 - 10 16 Thousand/uL    RBC 3 94 3 81 - 5 12 Million/uL    Hemoglobin 12 0 11 5 - 15 4 g/dL    Hematocrit 36 7 34 8 - 46 1 %    MCV 93 82 - 98 fL    MCH 30 5 26 8 - 34 3 pg    MCHC 32 7 31 4 - 37 4 g/dL    RDW 13 1 11 6 - 15 1 %    MPV 11 6 8 9 - 12 7 fL    Platelets 168 256 - 338 Thousands/uL    nRBC 0 /100 WBCs    Neutrophils Relative 65 43 - 75 %    Immat GRANS % 0 0 - 2 %    Lymphocytes Relative 22 14 - 44 %    Monocytes Relative 11 4 - 12 %    Eosinophils Relative 1 0 - 6 %    Basophils Relative 1 0 - 1 %    Neutrophils Absolute 7 60 1 85 - 7 62 Thousands/µL    Immature Grans Absolute 0 05 0 00 - 0 20 Thousand/uL    Lymphocytes Absolute 2 65 0 60 - 4 47 Thousands/µL    Monocytes Absolute 1 29 (H) 0 17 - 1 22 Thousand/µL    Eosinophils Absolute 0 17 0 00 - 0 61 Thousand/µL    Basophils Absolute 0 06 0 00 - 0 10 Thousands/µL   Fingerstick Glucose (POCT)    Collection Time: 08/27/19  6:07 AM   Result Value Ref Range    POC Glucose 134 65 - 140 mg/dl   Fingerstick Glucose (POCT)    Collection Time: 08/27/19 11:57 AM   Result Value Ref Range    POC Glucose 166 (H) 65 - 140 mg/dl     Imaging: I have personally reviewed pertinent reports      Cta Head And Neck With And Without Contrast    Result Date: 8/26/2019  Impression: Small chronic cortical infarction left frontal vertex, consistent with CT Greater than 70% luminal constriction at the origin of the left internal carotid artery Approximately 40% stenosis at the origin of the right internal carotid artery No additional evidence of critical stenosis, dissection or occlusion involving remaining cervical carotid or vertebral segments or visualized cerebral arteries Advanced multilevel degenerative cervical spondylosis Workstation performed: [id number]     Ct Head Without Contrast    Result Date: 8/26/2019  Impression: No acute intracranial process  No skull fracture  Chronic microangiopathy and 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  Workstation performed: [id number]     Ct Recon Only Cervical Spine (no Charge)    Result Date: 8/26/2019  Impression: Advanced multilevel degenerative spondylosis No acute cervical spine fracture or traumatic malalignment  Workstation performed: [id number]       EKG, Pathology, and Other Studies: I have personally reviewed pertinent reports